# Patient Record
Sex: FEMALE | Race: WHITE | Employment: FULL TIME | URBAN - NONMETROPOLITAN AREA
[De-identification: names, ages, dates, MRNs, and addresses within clinical notes are randomized per-mention and may not be internally consistent; named-entity substitution may affect disease eponyms.]

---

## 2017-12-15 ENCOUNTER — HOSPITAL ENCOUNTER (EMERGENCY)
Age: 42
Discharge: HOME | End: 2017-12-15
Payer: COMMERCIAL

## 2017-12-15 ENCOUNTER — HOSPITAL ENCOUNTER (EMERGENCY)
Dept: GENERAL RADIOLOGY | Age: 42
Discharge: HOME | End: 2017-12-15
Payer: COMMERCIAL

## 2017-12-15 VITALS
OXYGEN SATURATION: 98 % | HEIGHT: 64 IN | WEIGHT: 129.5 LBS | BODY MASS INDEX: 22.11 KG/M2 | TEMPERATURE: 99.3 F | HEART RATE: 108 BPM | DIASTOLIC BLOOD PRESSURE: 76 MMHG | SYSTOLIC BLOOD PRESSURE: 124 MMHG | RESPIRATION RATE: 20 BRPM

## 2017-12-15 DIAGNOSIS — Z72.0 TOBACCO ABUSE: ICD-10-CM

## 2017-12-15 DIAGNOSIS — J20.9 ACUTE BRONCHITIS, UNSPECIFIED ORGANISM: Primary | ICD-10-CM

## 2017-12-15 LAB
FLU A ANTIGEN: NEGATIVE
GROUP A STREP CULTURE, REFLEX: NEGATIVE
INFLUENZA B AG, EIA: NEGATIVE
REFLEX THROAT C + S: NORMAL

## 2017-12-15 PROCEDURE — 99214 OFFICE O/P EST MOD 30 MIN: CPT | Performed by: NURSE PRACTITIONER

## 2017-12-15 PROCEDURE — 99214 OFFICE O/P EST MOD 30 MIN: CPT

## 2017-12-15 PROCEDURE — 87880 STREP A ASSAY W/OPTIC: CPT

## 2017-12-15 PROCEDURE — 71020 XR CHEST STANDARD TWO VW: CPT

## 2017-12-15 PROCEDURE — 87804 INFLUENZA ASSAY W/OPTIC: CPT

## 2017-12-15 PROCEDURE — 87070 CULTURE OTHR SPECIMN AEROBIC: CPT

## 2017-12-15 RX ORDER — IBUPROFEN 200 MG
800 TABLET ORAL EVERY 6 HOURS PRN
COMMUNITY
End: 2021-02-18 | Stop reason: ALTCHOICE

## 2017-12-15 RX ORDER — AZITHROMYCIN 250 MG/1
TABLET, FILM COATED ORAL
Qty: 6 TABLET | Refills: 0 | Status: SHIPPED | OUTPATIENT
Start: 2017-12-15 | End: 2017-12-25

## 2017-12-15 RX ORDER — LORATADINE AND PSEUDOEPHEDRINE 10; 240 MG/1; MG/1
1 TABLET, EXTENDED RELEASE ORAL DAILY
Qty: 14 TABLET | Refills: 0 | Status: SHIPPED | OUTPATIENT
Start: 2017-12-15 | End: 2017-12-29

## 2017-12-15 RX ORDER — BENZONATATE 200 MG/1
200 CAPSULE ORAL 3 TIMES DAILY PRN
Qty: 21 CAPSULE | Refills: 0 | Status: SHIPPED | OUTPATIENT
Start: 2017-12-15 | End: 2017-12-22

## 2017-12-15 ASSESSMENT — PAIN DESCRIPTION - PAIN TYPE: TYPE: ACUTE PAIN

## 2017-12-15 ASSESSMENT — ENCOUNTER SYMPTOMS
FACIAL SWELLING: 0
SORE THROAT: 1
STRIDOR: 0
COUGH: 1
SHORTNESS OF BREATH: 0
VOMITING: 0
ABDOMINAL PAIN: 0
TROUBLE SWALLOWING: 0
SINUS CONGESTION: 1
RHINORRHEA: 0
SINUS PAIN: 0
NAUSEA: 0
VOICE CHANGE: 0
SINUS PRESSURE: 0

## 2017-12-15 ASSESSMENT — PAIN SCALES - GENERAL: PAINLEVEL_OUTOF10: 7

## 2017-12-15 ASSESSMENT — PAIN DESCRIPTION - LOCATION: LOCATION: THROAT

## 2017-12-15 ASSESSMENT — PAIN DESCRIPTION - DESCRIPTORS: DESCRIPTORS: SORE

## 2017-12-15 NOTE — ED PROVIDER NOTES
Wil[facility]  Urgent Care Encounter      CHIEF COMPLAINT       Chief Complaint   Patient presents with    Pharyngitis     x2 days    Headache    Fever     102 F last night       Nurses Notes reviewed and I agree except as noted in the HPI. HISTORY OF PRESENT ILLNESS   Rhea Wen is a 36 y.o. female who presents:  She is a every day smoker. She is having a deep harsh cough productive but unable to specify the color of it. She has had a headache, sore throat since Wednesday fever developed 102 last night that she measured. She did feel warm Wednesday but did not measure her temperature. The history is provided by the patient. Pharyngitis   Location:  Generalized  Quality:  Sharp  Severity:  Moderate  Onset quality:  Sudden  Duration:  2 days  Timing:  Constant  Progression:  Unchanged  Chronicity:  New  Relieved by:  Nothing  Worsened by:  Nothing  Ineffective treatments:  NSAIDs  Associated symptoms: cough, fever, headaches and sinus congestion    Associated symptoms: no abdominal pain, no adenopathy, no chest pain, no chills, no ear discharge, no ear pain, no night sweats, no postnasal drip, no rash, no rhinorrhea, no shortness of breath, no stridor, no trouble swallowing and no voice change    Associated symptoms comment:  Positive for body aches  Cough:     Cough characteristics:  Productive    Sputum characteristics:  Unable to specify    Severity:  Mild    Onset quality:  Sudden    Duration:  2 days    Timing:  Intermittent    Progression:  Unchanged    Chronicity:  New  Fever:     Timing:  Rare    Max temp PTA:  102    Temp source:  Subjective  Headaches:     Severity:  Moderate    Onset quality:  Sudden    Duration:  2 days    Timing:  Sporadic    Progression:  Unchanged    Chronicity:  New  Risk factors: no exposure to strep, no exposure to mono and no sick contacts        REVIEW OF SYSTEMS     Review of Systems   Constitutional: Positive for fever.  Negative for activity change, appetite change, chills, diaphoresis, fatigue, night sweats and unexpected weight change. HENT: Positive for congestion and sore throat. Negative for ear discharge, ear pain, facial swelling, postnasal drip, rhinorrhea, sinus pain, sinus pressure, trouble swallowing and voice change. Respiratory: Positive for cough. Negative for shortness of breath and stridor. Cardiovascular: Negative for chest pain. Gastrointestinal: Negative for abdominal pain, nausea and vomiting. Musculoskeletal: Positive for myalgias. Skin: Negative for rash. Neurological: Positive for headaches. Hematological: Negative for adenopathy. PAST MEDICAL HISTORY   History reviewed. No pertinent past medical history. SURGICAL HISTORY     Patient  has a past surgical history that includes Tonsillectomy and  section. CURRENT MEDICATIONS       Previous Medications    IBUPROFEN (ADVIL;MOTRIN) 200 MG TABLET    Take 400 mg by mouth every 6 hours as needed for Pain       ALLERGIES     Patient is is allergic to codeine. FAMILY HISTORY     Patient's family history is not on file. SOCIAL HISTORY     Patient  reports that she has been smoking Cigarettes. She has a 5.00 pack-year smoking history. She has never used smokeless tobacco. She reports that she does not drink alcohol or use drugs. PHYSICAL EXAM     ED TRIAGE VITALS  BP: 124/76, Temp: 99.3 °F (37.4 °C), Pulse: 108, Resp: 20, SpO2: 98 %  Physical Exam   Constitutional: She is oriented to person, place, and time. She appears well-developed and well-nourished. Non-toxic appearance. She does not have a sickly appearance. She does not appear ill. No distress. HENT:   Head: Normocephalic and atraumatic. Head is without right periorbital erythema and without left periorbital erythema.    Right Ear: Hearing, tympanic membrane, external ear and ear canal normal.   Left Ear: Hearing, tympanic membrane, external ear and ear canal normal.   Nose: Mucosal

## 2017-12-18 LAB — THROAT/NOSE CULTURE: NORMAL

## 2019-01-03 ENCOUNTER — HOSPITAL ENCOUNTER (EMERGENCY)
Age: 44
Discharge: HOME | End: 2019-01-03
Payer: COMMERCIAL

## 2019-01-03 VITALS
BODY MASS INDEX: 20.04 KG/M2 | TEMPERATURE: 98.8 F | SYSTOLIC BLOOD PRESSURE: 170 MMHG | OXYGEN SATURATION: 97 % | WEIGHT: 117.4 LBS | HEIGHT: 64 IN | RESPIRATION RATE: 16 BRPM | HEART RATE: 97 BPM | DIASTOLIC BLOOD PRESSURE: 81 MMHG

## 2019-01-03 DIAGNOSIS — R55 SYNCOPE AND COLLAPSE: ICD-10-CM

## 2019-01-03 DIAGNOSIS — S01.01XA LACERATION OF SCALP, INITIAL ENCOUNTER: Primary | ICD-10-CM

## 2019-01-03 PROCEDURE — 2709999900 HC NON-CHARGEABLE SUPPLY

## 2019-01-03 PROCEDURE — 99212 OFFICE O/P EST SF 10 MIN: CPT

## 2019-01-03 PROCEDURE — 99213 OFFICE O/P EST LOW 20 MIN: CPT | Performed by: NURSE PRACTITIONER

## 2019-01-03 ASSESSMENT — ENCOUNTER SYMPTOMS: COLOR CHANGE: 0

## 2019-03-16 ENCOUNTER — HOSPITAL ENCOUNTER (EMERGENCY)
Age: 44
Discharge: HOME | End: 2019-03-16
Attending: INTERNAL MEDICINE

## 2019-03-16 ENCOUNTER — APPOINTMENT (OUTPATIENT)
Dept: CT IMAGING | Age: 44
End: 2019-03-16

## 2019-03-16 VITALS
RESPIRATION RATE: 16 BRPM | WEIGHT: 130 LBS | TEMPERATURE: 98.5 F | HEART RATE: 129 BPM | OXYGEN SATURATION: 98 % | BODY MASS INDEX: 22.2 KG/M2 | HEIGHT: 64 IN | SYSTOLIC BLOOD PRESSURE: 167 MMHG | DIASTOLIC BLOOD PRESSURE: 85 MMHG

## 2019-03-16 DIAGNOSIS — R55 SYNCOPE AND COLLAPSE: Primary | ICD-10-CM

## 2019-03-16 DIAGNOSIS — S09.90XA INJURY OF HEAD, INITIAL ENCOUNTER: ICD-10-CM

## 2019-03-16 LAB
ALBUMIN SERPL-MCNC: 4.9 G/DL (ref 3.5–5.1)
ALP BLD-CCNC: 73 U/L (ref 38–126)
ALT SERPL-CCNC: 18 U/L (ref 11–66)
ANION GAP SERPL CALCULATED.3IONS-SCNC: 20 MEQ/L (ref 8–16)
AST SERPL-CCNC: 27 U/L (ref 5–40)
ATYPICAL LYMPHOCYTES: ABNORMAL %
BASOPHILS # BLD: 1 %
BASOPHILS ABSOLUTE: 0.1 THOU/MM3 (ref 0–0.1)
BILIRUB SERPL-MCNC: < 0.2 MG/DL (ref 0.3–1.2)
BUN BLDV-MCNC: 7 MG/DL (ref 7–22)
CALCIUM SERPL-MCNC: 9.1 MG/DL (ref 8.5–10.5)
CHLORIDE BLD-SCNC: 97 MEQ/L (ref 98–111)
CO2: 20 MEQ/L (ref 23–33)
CREAT SERPL-MCNC: 0.7 MG/DL (ref 0.4–1.2)
EKG ATRIAL RATE: 125 BPM
EKG P AXIS: 81 DEGREES
EKG P-R INTERVAL: 180 MS
EKG Q-T INTERVAL: 298 MS
EKG QRS DURATION: 90 MS
EKG QTC CALCULATION (BAZETT): 430 MS
EKG R AXIS: 41 DEGREES
EKG T AXIS: 62 DEGREES
EKG VENTRICULAR RATE: 125 BPM
ELLIPTOCYTES: ABNORMAL
EOSINOPHIL # BLD: 2 %
EOSINOPHILS ABSOLUTE: 0.2 THOU/MM3 (ref 0–0.4)
ERYTHROCYTE [DISTWIDTH] IN BLOOD BY AUTOMATED COUNT: 21.7 % (ref 11.5–14.5)
ERYTHROCYTE [DISTWIDTH] IN BLOOD BY AUTOMATED COUNT: 61.5 FL (ref 35–45)
GFR SERPL CREATININE-BSD FRML MDRD: > 90 ML/MIN/1.73M2
GLUCOSE BLD-MCNC: 102 MG/DL (ref 70–108)
HCT VFR BLD CALC: 35.9 % (ref 37–47)
HEMOGLOBIN: 10.2 GM/DL (ref 12–16)
HYPOCHROMIA: PRESENT
IMMATURE GRANS (ABS): 0.03 THOU/MM3 (ref 0–0.07)
IMMATURE GRANULOCYTES: 0.3 %
LYMPHOCYTES # BLD: 38 %
LYMPHOCYTES ABSOLUTE: 4 THOU/MM3 (ref 1–4.8)
MCH RBC QN AUTO: 22.6 PG (ref 26–33)
MCHC RBC AUTO-ENTMCNC: 28.4 GM/DL (ref 32.2–35.5)
MCV RBC AUTO: 79.6 FL (ref 81–99)
MONOCYTES # BLD: 9 %
MONOCYTES ABSOLUTE: 0.9 THOU/MM3 (ref 0.4–1.3)
NUCLEATED RED BLOOD CELLS: 0 /100 WBC
OSMOLALITY CALCULATION: 272 MOSMOL/KG (ref 275–300)
PLATELET # BLD: 493 THOU/MM3 (ref 130–400)
PLATELET ESTIMATE: ADEQUATE
PMV BLD AUTO: 11.2 FL (ref 9.4–12.4)
POIKILOCYTES: ABNORMAL
POTASSIUM SERPL-SCNC: 3.5 MEQ/L (ref 3.5–5.2)
RBC # BLD: 4.51 MILL/MM3 (ref 4.2–5.4)
SCAN OF BLOOD SMEAR: NORMAL
SEG NEUTROPHILS: 50 %
SEGMENTED NEUTROPHILS ABSOLUTE COUNT: 5.2 THOU/MM3 (ref 1.8–7.7)
SODIUM BLD-SCNC: 137 MEQ/L (ref 135–145)
TARGET CELLS: ABNORMAL
TOTAL PROTEIN: 7.7 G/DL (ref 6.1–8)
WBC # BLD: 10.4 THOU/MM3 (ref 4.8–10.8)

## 2019-03-16 PROCEDURE — 99284 EMERGENCY DEPT VISIT MOD MDM: CPT

## 2019-03-16 PROCEDURE — 80053 COMPREHEN METABOLIC PANEL: CPT

## 2019-03-16 PROCEDURE — 85025 COMPLETE CBC W/AUTO DIFF WBC: CPT

## 2019-03-16 PROCEDURE — 36415 COLL VENOUS BLD VENIPUNCTURE: CPT

## 2019-03-16 RX ORDER — 0.9 % SODIUM CHLORIDE 0.9 %
1000 INTRAVENOUS SOLUTION INTRAVENOUS ONCE
Status: DISCONTINUED | OUTPATIENT
Start: 2019-03-16 | End: 2019-03-16 | Stop reason: HOSPADM

## 2019-03-16 ASSESSMENT — ENCOUNTER SYMPTOMS
SHORTNESS OF BREATH: 0
VOMITING: 0
ABDOMINAL PAIN: 0
NAUSEA: 0
DIARRHEA: 0
BACK PAIN: 0
EYE DISCHARGE: 0
COUGH: 0
RHINORRHEA: 0
WHEEZING: 0
SORE THROAT: 0
EYE PAIN: 0

## 2019-05-24 ENCOUNTER — HOSPITAL ENCOUNTER (EMERGENCY)
Age: 44
Discharge: HOME | End: 2019-05-24

## 2019-05-24 VITALS
WEIGHT: 132 LBS | TEMPERATURE: 97.9 F | OXYGEN SATURATION: 98 % | RESPIRATION RATE: 18 BRPM | DIASTOLIC BLOOD PRESSURE: 78 MMHG | SYSTOLIC BLOOD PRESSURE: 152 MMHG | BODY MASS INDEX: 22.66 KG/M2 | HEART RATE: 90 BPM

## 2019-05-24 DIAGNOSIS — K08.89 PAIN, DENTAL: Primary | ICD-10-CM

## 2019-05-24 DIAGNOSIS — K04.7 DENTAL ABSCESS: ICD-10-CM

## 2019-05-24 PROCEDURE — 99213 OFFICE O/P EST LOW 20 MIN: CPT | Performed by: NURSE PRACTITIONER

## 2019-05-24 PROCEDURE — 99212 OFFICE O/P EST SF 10 MIN: CPT

## 2019-05-24 RX ORDER — AMOXICILLIN AND CLAVULANATE POTASSIUM 875; 125 MG/1; MG/1
1 TABLET, FILM COATED ORAL 2 TIMES DAILY WITH MEALS
Qty: 14 TABLET | Refills: 0 | Status: SHIPPED | OUTPATIENT
Start: 2019-05-24 | End: 2019-05-31

## 2019-05-24 ASSESSMENT — ENCOUNTER SYMPTOMS
SHORTNESS OF BREATH: 0
DIARRHEA: 0
FACIAL SWELLING: 1
VOMITING: 0
ABDOMINAL PAIN: 0
NAUSEA: 0
CHEST TIGHTNESS: 0

## 2019-05-24 ASSESSMENT — PAIN DESCRIPTION - LOCATION: LOCATION: TEETH

## 2019-05-24 ASSESSMENT — PAIN DESCRIPTION - ORIENTATION: ORIENTATION: LOWER;LEFT

## 2019-05-24 ASSESSMENT — PAIN DESCRIPTION - FREQUENCY: FREQUENCY: CONTINUOUS

## 2019-05-24 ASSESSMENT — PAIN DESCRIPTION - DESCRIPTORS: DESCRIPTORS: ACHING

## 2019-05-24 ASSESSMENT — PAIN SCALES - GENERAL: PAINLEVEL_OUTOF10: 7

## 2019-05-24 ASSESSMENT — PAIN DESCRIPTION - PAIN TYPE: TYPE: ACUTE PAIN

## 2019-05-24 NOTE — ED TRIAGE NOTES
Pt ambulatory into [facility] with c/o lower left dental pain for the past two days. Pt states pain 7. Pt states she has a dentist in [place] but lost her medical when her  passed away in December. Pt states she is waiting to hear back about medicaid.

## 2019-05-24 NOTE — ED PROVIDER NOTES
39 [address] Reilly       Chief Complaint   Patient presents with    Dental Pain     lower left        Nurses Notes reviewed and I agree except as noted in the HPI. HISTORY OF PRESENT ILLNESS   Rhea Damico is a 39 y.o. female who presents for evaluation of dental pain and swelling, left lower that began several days ago. Patient reports that she knows that the area the hurts once had a root canal that \"went wrong. \"  She reports sensitivity to cold and hot foods. Reports that she does have a dentist but is currently awaiting insurance coverage following the death of her . REVIEW OF SYSTEMS     Review of Systems   Constitutional: Negative for chills and fever. HENT: Positive for dental problem and facial swelling. Eyes: Negative for visual disturbance. Respiratory: Negative for chest tightness and shortness of breath. Cardiovascular: Negative for chest pain. Gastrointestinal: Negative for abdominal pain, diarrhea, nausea and vomiting. Genitourinary: Negative for dysuria. Musculoskeletal: Negative for joint swelling. Skin: Negative for rash. Allergic/Immunologic: Negative for environmental allergies and food allergies. Neurological: Negative for headaches. Psychiatric/Behavioral: The patient is not nervous/anxious. PAST MEDICAL HISTORY   History reviewed. No pertinent past medical history. SURGICAL HISTORY     Patient  has a past surgical history that includes Tonsillectomy and  section. CURRENT MEDICATIONS       Discharge Medication List as of 2019  3:41 PM      CONTINUE these medications which have NOT CHANGED    Details   ibuprofen (ADVIL;MOTRIN) 200 MG tablet Take 600 mg by mouth every 6 hours as needed for Pain Historical Med             ALLERGIES     Patient is is allergic to codeine. FAMILY HISTORY     Patient'sfamily history is not on file.     SOCIAL HISTORY     Patient  reports that she has been smoking cigarettes. She has a 5.00 pack-year smoking history. She has never used smokeless tobacco. She reports that she does not drink alcohol or use drugs. PHYSICAL EXAM     ED TRIAGE VITALS  BP: (!) 152/78, Temp: 97.9 °F (36.6 °C), Pulse: 90, Resp: 18, SpO2: 98 %  Physical Exam   Constitutional: She is oriented to person, place, and time. She appears well-developed and well-nourished. She is cooperative. No distress. HENT:   Head: Normocephalic and atraumatic. Mouth/Throat: Mucous membranes are normal. Abnormal dentition. Dental caries present. Eyes: Conjunctivae are normal. Right conjunctiva is not injected. Left conjunctiva is not injected. Pupils are equal.   Neck: Normal range of motion. Cardiovascular: Normal rate. Pulmonary/Chest: Effort normal.   Musculoskeletal: Normal range of motion. Lymphadenopathy:     She has no cervical adenopathy. Neurological: She is alert and oriented to person, place, and time. Skin: Skin is warm and dry. No rash (on exposed surfaces) noted. Psychiatric: She has a normal mood and affect. Her speech is normal.   Nursing note and vitals reviewed. DIAGNOSTIC RESULTS   Labs: No results found for this visit on 05/24/19. IMAGING:  No orders to display     URGENT CARE COURSE:     Vitals:    05/24/19 1526   BP: (!) 152/78   Pulse: 90   Resp: 18   Temp: 97.9 °F (36.6 °C)   TempSrc: Temporal   SpO2: 98%   Weight: 132 lb (59.9 kg)       Medications - No data to display  PROCEDURES:  None  FINALIMPRESSION      1. Pain, dental    2. Dental abscess        DISPOSITION/PLAN   DISPOSITION    Discharge   Physical assessment findings, diagnostic testing(s) if applicable, and vital signs reviewed with patient/patient representative. Questions answered. Medications as directed, including OTC medications for supportive care. Education provided on medications. Differential diagnosis(s) discussed with patient/patient representative.   Home care/self care instructions reviewed with patient/patient representative. Patient is to follow-up with family care provider in 2-3 days if no improvement. Patient is to go to the emergency department if symptoms worsen. Patient/patient representative is aware of care plan, questions answered, verbalizes understanding and is in agreement. Teach back method used for patient/patient representative teaching(s) and printed instructions attached to after visit summary. PATIENT REFERRED TO:  [address] [facility] [address]  [facility]. [address] [address]   Schedule an appointment as soon as possible for a visit in 3 days  if you do not have a family provider    [facility]  [address]  [phone]    As needed, If symptoms worsen, GO DIRECTLY TO THE EMERGENCY DEPARTMENT    DISCHARGE MEDICATIONS:  Discharge Medication List as of 5/24/2019  3:41 PM      START taking these medications    Details   amoxicillin-clavulanate (AUGMENTIN) 875-125 MG per tablet Take 1 tablet by mouth 2 times daily (with meals) for 7 days, Disp-14 tablet, R-0Normal           Discharge Medication List as of 5/24/2019  3:41 PM          Shannan Glasgow, [address], APRN - CNP  05/24/19 4301

## 2019-05-24 NOTE — ED NOTES
Pt verbalized discharge instructions. Pt informed to go to ER if develop chest pain, shortness of breath or abdominal pain. Pt ambulatory out in stable condition. Assessment unchanged.        Ayush Nagel RN  05/24/19 1332

## 2020-06-18 ENCOUNTER — HOSPITAL ENCOUNTER (EMERGENCY)
Age: 45
Discharge: HOME | End: 2020-06-18
Payer: MEDICAID

## 2020-06-18 VITALS
RESPIRATION RATE: 16 BRPM | SYSTOLIC BLOOD PRESSURE: 152 MMHG | TEMPERATURE: 97.2 F | WEIGHT: 116 LBS | OXYGEN SATURATION: 99 % | HEART RATE: 96 BPM | BODY MASS INDEX: 19.91 KG/M2 | DIASTOLIC BLOOD PRESSURE: 73 MMHG

## 2020-06-18 PROCEDURE — 99213 OFFICE O/P EST LOW 20 MIN: CPT | Performed by: NURSE PRACTITIONER

## 2020-06-18 PROCEDURE — 99213 OFFICE O/P EST LOW 20 MIN: CPT

## 2020-06-18 PROCEDURE — 87169 MACROSCOPIC EXAM PARASITE: CPT

## 2020-06-18 RX ORDER — IVERMECTIN 3 MG/1
400 TABLET ORAL ONCE
Qty: 7 TABLET | Refills: 0 | Status: SHIPPED | OUTPATIENT
Start: 2020-06-18 | End: 2020-06-18

## 2020-06-18 RX ORDER — PERMETHRIN 50 MG/G
CREAM TOPICAL
Qty: 1 TUBE | Refills: 1 | Status: SHIPPED | OUTPATIENT
Start: 2020-06-18 | End: 2020-09-03 | Stop reason: ALTCHOICE

## 2020-06-18 ASSESSMENT — ENCOUNTER SYMPTOMS
CHEST TIGHTNESS: 0
VOMITING: 0
SHORTNESS OF BREATH: 0
HOARSE VOICE: 0
ABDOMINAL PAIN: 0
COUGH: 0
WHEEZING: 0
COLOR CHANGE: 0
SORE THROAT: 0
STRIDOR: 0
DIARRHEA: 1
RECTAL PAIN: 0
APNEA: 0
ANAL BLEEDING: 0
BACK PAIN: 0
BLOOD IN STOOL: 0
CHOKING: 0
ABDOMINAL DISTENTION: 0
NAUSEA: 0
PERI-ORBITAL EDEMA: 0
CONSTIPATION: 0
THROAT SWELLING: 0

## 2020-06-18 NOTE — ED NOTES
Pt verbalized discharge instructions. Pt informed to go to ER if develop chest pain, shortness of breath or abdominal pain. Pt ambulatory out in stable condition. Assessment unchanged.        Chaim Ruffin RN  06/18/20 8613

## 2020-06-18 NOTE — ED PROVIDER NOTES
appetite change, chills, diaphoresis, fatigue and fever. HENT: Negative for hoarse voice and sore throat. Respiratory: Negative for apnea, cough, choking, chest tightness, shortness of breath, wheezing and stridor. Cardiovascular: Negative for chest pain, palpitations and leg swelling. Gastrointestinal: Positive for diarrhea. Negative for abdominal distention, abdominal pain, anal bleeding, blood in stool, constipation, nausea, rectal pain and vomiting. Musculoskeletal: Negative for arthralgias, back pain, gait problem, joint swelling, myalgias, neck pain and neck stiffness. Skin: Positive for rash. Negative for color change, pallor and wound. Neurological: Negative for dizziness, light-headedness and headaches. PAST MEDICAL HISTORY   History reviewed. No pertinent past medical history. SURGICAL HISTORY     Patient  has a past surgical history that includes Tonsillectomy and  section. CURRENT MEDICATIONS       Discharge Medication List as of 2020  7:11 PM      CONTINUE these medications which have NOT CHANGED    Details   OLIVE LEAF PO Take by mouthHistorical Med      Sulfur 3 % BAR Apply topicallyHistorical Med      ibuprofen (ADVIL;MOTRIN) 200 MG tablet Take 800 mg by mouth every 6 hours as needed for Pain Historical Med             ALLERGIES     Patient is is allergic to codeine. FAMILY HISTORY     Patient's family history is not on file. SOCIAL HISTORY     Patient  reports that she has been smoking cigarettes. She has a 5.00 pack-year smoking history. She has never used smokeless tobacco. She reports that she does not drink alcohol or use drugs. PHYSICAL EXAM     ED TRIAGE VITALS  BP: (!) 152/73, Temp: 97.2 °F (36.2 °C), Pulse: 96, Resp: 16, SpO2: 99 %  Physical Exam  Vitals signs and nursing note reviewed. Constitutional:       Appearance: Normal appearance. HENT:      Head: Normocephalic and atraumatic.       Nose: Nose normal.   Eyes:      Extraocular

## 2020-06-20 LAB — MISC. #1 REFERENCE GROUP TEST: NORMAL

## 2020-09-02 ENCOUNTER — OFFICE VISIT (OUTPATIENT)
Dept: FAMILY MEDICINE CLINIC | Age: 45
End: 2020-09-02
Payer: MEDICAID

## 2020-09-02 VITALS
WEIGHT: 112.2 LBS | OXYGEN SATURATION: 99 % | SYSTOLIC BLOOD PRESSURE: 138 MMHG | BODY MASS INDEX: 19.88 KG/M2 | TEMPERATURE: 98.5 F | HEART RATE: 104 BPM | DIASTOLIC BLOOD PRESSURE: 91 MMHG | HEIGHT: 63 IN

## 2020-09-02 PROCEDURE — 99386 PREV VISIT NEW AGE 40-64: CPT | Performed by: NURSE PRACTITIONER

## 2020-09-02 PROCEDURE — G8431 POS CLIN DEPRES SCRN F/U DOC: HCPCS | Performed by: NURSE PRACTITIONER

## 2020-09-02 PROCEDURE — 99204 OFFICE O/P NEW MOD 45 MIN: CPT | Performed by: NURSE PRACTITIONER

## 2020-09-02 PROCEDURE — G8427 DOCREV CUR MEDS BY ELIG CLIN: HCPCS | Performed by: NURSE PRACTITIONER

## 2020-09-02 PROCEDURE — G8420 CALC BMI NORM PARAMETERS: HCPCS | Performed by: NURSE PRACTITIONER

## 2020-09-02 PROCEDURE — 4004F PT TOBACCO SCREEN RCVD TLK: CPT | Performed by: NURSE PRACTITIONER

## 2020-09-02 RX ORDER — PNEUMOCOCCAL VACCINE POLYVALENT 25; 25; 25; 25; 25; 25; 25; 25; 25; 25; 25; 25; 25; 25; 25; 25; 25; 25; 25; 25; 25; 25; 25 UG/.5ML; UG/.5ML; UG/.5ML; UG/.5ML; UG/.5ML; UG/.5ML; UG/.5ML; UG/.5ML; UG/.5ML; UG/.5ML; UG/.5ML; UG/.5ML; UG/.5ML; UG/.5ML; UG/.5ML; UG/.5ML; UG/.5ML; UG/.5ML; UG/.5ML; UG/.5ML; UG/.5ML; UG/.5ML; UG/.5ML
0.5 INJECTION, SOLUTION INTRAMUSCULAR; SUBCUTANEOUS ONCE
Qty: 0.5 ML | Refills: 0 | Status: SHIPPED | OUTPATIENT
Start: 2020-09-02 | End: 2020-09-02

## 2020-09-02 RX ORDER — CETIRIZINE HYDROCHLORIDE 10 MG/1
10 TABLET ORAL DAILY
Qty: 30 TABLET | Refills: 0 | Status: SHIPPED | OUTPATIENT
Start: 2020-09-02 | End: 2020-10-02

## 2020-09-02 ASSESSMENT — PATIENT HEALTH QUESTIONNAIRE - PHQ9
SUM OF ALL RESPONSES TO PHQ QUESTIONS 1-9: 18
8. MOVING OR SPEAKING SO SLOWLY THAT OTHER PEOPLE COULD HAVE NOTICED. OR THE OPPOSITE, BEING SO FIGETY OR RESTLESS THAT YOU HAVE BEEN MOVING AROUND A LOT MORE THAN USUAL: 1
3. TROUBLE FALLING OR STAYING ASLEEP: 3
10. IF YOU CHECKED OFF ANY PROBLEMS, HOW DIFFICULT HAVE THESE PROBLEMS MADE IT FOR YOU TO DO YOUR WORK, TAKE CARE OF THINGS AT HOME, OR GET ALONG WITH OTHER PEOPLE: 1
1. LITTLE INTEREST OR PLEASURE IN DOING THINGS: 3
9. THOUGHTS THAT YOU WOULD BE BETTER OFF DEAD, OR OF HURTING YOURSELF: 1
5. POOR APPETITE OR OVEREATING: 0
7. TROUBLE CONCENTRATING ON THINGS, SUCH AS READING THE NEWSPAPER OR WATCHING TELEVISION: 1
6. FEELING BAD ABOUT YOURSELF - OR THAT YOU ARE A FAILURE OR HAVE LET YOURSELF OR YOUR FAMILY DOWN: 3
SUM OF ALL RESPONSES TO PHQ9 QUESTIONS 1 & 2: 6
SUM OF ALL RESPONSES TO PHQ QUESTIONS 1-9: 18
4. FEELING TIRED OR HAVING LITTLE ENERGY: 3
2. FEELING DOWN, DEPRESSED OR HOPELESS: 3

## 2020-09-02 NOTE — PROGRESS NOTES
[facility],[address] [facility], [facility]. [place] [address] [facility]  Dept: [phone]  Dept Fax: : [phone]  Loc Fax: [phone]     Sarah Zarate is a 38 y.o. female who presents today for her medical conditions/complaintsas noted below. Chief Complaint   Patient presents with    Establish Care       HPI:      Here to establish care    Family- lives in [facility] and has family that is around     Occupation- was working until December    -  bebeto  in 2018 from mad cow disease-     Kelly Moore is soon to be daughter in law and grand daughter are my patients     Diet- decreased appetite     Exercise- walking every day, [place], [place]     Sleep - is having trouble getting to sleep and staying asleep     Dental- has not been in months,     Eye- did have, but does not have now, states that she gets headaches. Did see a neurologist in the past. Excedrin    Last labs?  Has not had     PAP- has been years    Mammogram- has not had one    Elevated blood pressure reading   Onset today  Has not been told this in the past  Denies cp or palpitations     Depression  Onset years ago  Last episodes was 11 years ago  Symptoms returned the last year  Did take zoloft in the past.   Lost 20 lbs in a year and half   Does not feel like eating  Wants candy all of the time  Talked to johanny in the past    Heartburn  Worse in the morning  Coffee and cholcoate will make it worse    Easy bruising and rash -   Onset 6 months ago  States was seen at urgent care  Was treated for scabies, and this went away with cream   Lost her insurance and   No itching  Has been using sulfar soap  Is picking at her skin, worse at night, states she digs white material at times     Hair has been falling out              Medications:    Current Outpatient Medications:     sertraline (ZOLOFT) 50 MG tablet, Take 1 tablet by Neurological: Positive for headaches. Negative for dizziness, weakness and numbness. Psychiatric/Behavioral: Positive for agitation, behavioral problems (skin picking disorder), decreased concentration and sleep disturbance. Negative for dysphoric mood, hallucinations, self-injury and suicidal ideas. The patient is nervous/anxious. The patient is not hyperactive. Objective:     BP (!) 138/91 (Site: Right Upper Arm, Position: Sitting, Cuff Size: Medium Adult)   Pulse 104   Temp 98.5 °F (36.9 °C) (Temporal)   Ht 5' 3\" (1.6 m)   Wt 112 lb 3.2 oz (50.9 kg)   LMP 12/01/2019 (Approximate)   SpO2 99%   BMI 19.88 kg/m²      Physical Exam  Vitals signs reviewed. Constitutional:       Appearance: She is well-developed. HENT:      Head: Normocephalic and atraumatic. Right Ear: External ear normal.      Left Ear: External ear normal.   Eyes:      Conjunctiva/sclera: Conjunctivae normal.   Neck:      Musculoskeletal: Normal range of motion and neck supple. No spinous process tenderness. Thyroid: No thyromegaly. Trachea: Trachea normal.   Cardiovascular:      Rate and Rhythm: Normal rate and regular rhythm. Heart sounds: Normal heart sounds. No murmur. No friction rub. No gallop. Pulmonary:      Effort: Pulmonary effort is normal.      Breath sounds: Normal breath sounds. Abdominal:      General: Bowel sounds are normal.      Palpations: Abdomen is soft. Tenderness: There is no abdominal tenderness. Musculoskeletal: Normal range of motion. Skin:     General: Skin is warm and dry. Findings: Rash present. No erythema. Comments: Scabs in different stages of healing consistent with skin picking disorder, noted to torso, arms and legs and face, some hyperpigmentation    Neurological:      Mental Status: She is alert and oriented to person, place, and time. Psychiatric:         Speech: Speech normal.         Behavior: Behavior normal.         Thought Content:  Thought content normal.         Assessment/Plan:      Rhea was seen today for establish care. Diagnoses and all orders for this visit:    Annual physical exam  -     CBC Auto Differential; Future  -     Comprehensive Metabolic Panel, Fasting; Future  -     Hemoglobin A1C; Future  -     Lipid Panel; Future  -     TSH with Reflex; Future  -     JEFFERSON DIGITAL SCREEN W OR WO CAD BILATERAL; Future  -     Tdap (ADACEL) 5-2-15.5 LF-MCG/0.5 injection; Inject 0.5 mLs into the muscle once for 1 dose  -     pneumococcal polyvalent (PNEUMOVAX 23) 25 MCG/0.5ML inj; Inject 0.5 mLs into the muscle once for 1 dose    Screening for breast cancer  -     JEFFERSON DIGITAL SCREEN W OR WO CAD BILATERAL; Future    Screening for colon cancer  -     [facility]) - Carrie Vance MD, Gastroenterology, [address] [facility]    Need for diphtheria-tetanus-pertussis (Tdap) vaccine  -     Tdap (ADACEL) 5-2-15.5 LF-MCG/0.5 injection; Inject 0.5 mLs into the muscle once for 1 dose    Need for prophylactic vaccination against Streptococcus pneumoniae (pneumococcus)  -     pneumococcal polyvalent (PNEUMOVAX 23) 25 MCG/0.5ML inj; Inject 0.5 mLs into the muscle once for 1 dose    Positive depression screening  -     Positive Screen for Clinical Depression with a Documented Follow-up Plan     Moderate major depression ([facility] [address]   Uncontrolled   Concerned that rash is skin picking disorder   May swab site and treat for staph infection next week   Would like for her to get labs and rule out any thyroid issues   -     sertraline (ZOLOFT) 50 MG tablet; Take 1 tablet by mouth daily  -     cetirizine (ZYRTEC) 10 MG tablet; Take 1 tablet by mouth daily   Weight loss was gradual but significant over the last couple of years, likely associated with depression- however recommended labs and preventative health work up   Itching  -     cetirizine (ZYRTEC) 10 MG tablet;  Take 1 tablet by mouth daily    Skin-picking disorder   See above   Elevated blood pressure reading   Likely could be uncontrolled depression    Will re eval in a week when she returns      Return in about 1 week (around 9/9/2020) for fu labs, bp, mood . Rhea received counselingon the following healthy behaviors: nutrition and exercise    Patientgiven educational materials on wellness    I have instructedStyves to complete a self tracking handout on Weights and instructedthem to bring it with them to her next appointment. Discussed use, benefit, andside effects of prescribed medications. Barriers to medication compliance addressed. All patient questions answered. Pt voiced understanding. Electronically signedby MARIAELENA Poole CNP on 9/3/2020 at 2:51 PM      On the basis of positive PHQ-9 screening (PHQ-9 Total Score: 18), the following plan was implemented: medication prescribed: Zoloft- started today- patient will call for any significant medication side effects or worsening symptoms of depression. Patient will follow-up in 2 week(s) with PCP.

## 2020-09-02 NOTE — PATIENT INSTRUCTIONS

## 2020-09-03 PROBLEM — L29.9 ITCHING: Status: ACTIVE | Noted: 2020-09-03

## 2020-09-03 PROBLEM — F42.4 SKIN-PICKING DISORDER: Status: ACTIVE | Noted: 2020-09-03

## 2020-09-03 PROBLEM — Z13.31 POSITIVE DEPRESSION SCREENING: Status: ACTIVE | Noted: 2020-09-03

## 2020-09-03 PROBLEM — F32.1 MODERATE MAJOR DEPRESSION (HCC): Status: ACTIVE | Noted: 2020-09-03

## 2020-09-03 PROBLEM — R03.0 ELEVATED BLOOD PRESSURE READING: Status: ACTIVE | Noted: 2020-09-03

## 2020-09-03 ASSESSMENT — ENCOUNTER SYMPTOMS
COUGH: 0
CHEST TIGHTNESS: 0
ABDOMINAL PAIN: 0
DIARRHEA: 0
CONSTIPATION: 0
VOMITING: 0
EYE DISCHARGE: 0
RHINORRHEA: 0
SHORTNESS OF BREATH: 0

## 2020-10-19 ENCOUNTER — VIRTUAL VISIT (OUTPATIENT)
Dept: FAMILY MEDICINE CLINIC | Age: 45
End: 2020-10-19
Payer: MEDICAID

## 2020-10-19 PROCEDURE — G8427 DOCREV CUR MEDS BY ELIG CLIN: HCPCS | Performed by: NURSE PRACTITIONER

## 2020-10-19 PROCEDURE — 99213 OFFICE O/P EST LOW 20 MIN: CPT | Performed by: NURSE PRACTITIONER

## 2020-10-19 ASSESSMENT — ENCOUNTER SYMPTOMS
EYE DISCHARGE: 0
VOMITING: 0
CONSTIPATION: 0
RHINORRHEA: 0
SHORTNESS OF BREATH: 0
DIARRHEA: 0
ABDOMINAL PAIN: 0
SORE THROAT: 1
COUGH: 0
CHEST TIGHTNESS: 0

## 2020-10-19 NOTE — PROGRESS NOTES
[facility],[address] [facility]. [place] [address] [facility]  Dept: [phone]  Dept Fax: : [phone]  [facility] Fax: [phone]     Evelyn Cook is a 54 y.o. female who presents today for her medical conditions/complaintsas noted below. Chief Complaint   Patient presents with    Concern For COVID-19       HPI:      Concern for covid  Had exposure from a friend  Onset of symptoms x 1 day   Has sore throat headaches, fevers and body aches   Has not tried anything for symptoms  Denies SOB or cough  Is not currently working. Medications:    Current Outpatient Medications:     sertraline (ZOLOFT) 50 MG tablet, Take 1 tablet by mouth daily, Disp: 30 tablet, Rfl: 1    OLIVE LEAF PO, Take by mouth, Disp: , Rfl:     Sulfur 3 % BAR, Apply topically, Disp: , Rfl:     hydrocortisone (WESTCORT) 0.2 % cream, Apply topically 2 times daily. , Disp: 45 g, Rfl: 0    ibuprofen (ADVIL;MOTRIN) 200 MG tablet, Take 800 mg by mouth every 6 hours as needed for Pain , Disp: , Rfl:     The patientis allergic to codeine. Past Medical History  Rhea  has a past medical history of Moderate major depression ([address] Past Surgical History  The patient  has a past surgical history that includes Tonsillectomy and  section. Family History  This patient's family history includes High Blood Pressure in her father and mother; Osteoporosis in her mother. Social History  Rhea  reports that she has been smoking cigarettes. She has a 5.00 pack-year smoking history. She has never used smokeless tobacco. She reports that she does not drink alcohol or use drugs.     Health Maintenance  Health Maintenance Due   Topic Date Due    Pneumococcal 0-64 years Vaccine (1 of 1 - PPSV23) 1980    DTaP/Tdap/Td vaccine (1 - Tdap) 1993    Cervical cancer screen  1995    Lipid screen  2014    Flu vaccine (1) 2020       Subjective:     Review of Systems   Constitutional: Positive for fever. Negative for activity change and appetite change. HENT: Positive for sore throat. Negative for congestion, ear pain and rhinorrhea. Eyes: Negative for discharge and visual disturbance. Respiratory: Negative for cough, chest tightness and shortness of breath. Cardiovascular: Negative for chest pain and palpitations. Gastrointestinal: Negative for abdominal pain, constipation, diarrhea and vomiting. Genitourinary: Negative for difficulty urinating and hematuria. Musculoskeletal: Positive for myalgias. Negative for arthralgias. Skin: Positive for rash. Hair is falling out  rash   Neurological: Positive for headaches. Negative for dizziness, weakness and numbness. Psychiatric/Behavioral: Positive for agitation, behavioral problems (skin picking disorder), decreased concentration and sleep disturbance. Negative for dysphoric mood, hallucinations, self-injury and suicidal ideas. The patient is nervous/anxious. The patient is not hyperactive. Objective: There were no vitals taken for this visit. Physical Exam  Constitutional:       General: She is not in acute distress. Appearance: She is well-developed. Interventions: She is not intubated. HENT:      Head: Normocephalic and atraumatic. Right Ear: External ear normal.      Left Ear: External ear normal.   Eyes:      General: Lids are normal.      Conjunctiva/sclera: Conjunctivae normal.   Neck:      Musculoskeletal: Normal range of motion. Pulmonary:      Effort: No tachypnea, bradypnea, prolonged expiration, respiratory distress or retractions. She is not intubated. Skin:     Coloration: Skin is not pale. Findings: No erythema or rash. Neurological:      Mental Status: She is alert and oriented to person, place, and time.    Psychiatric:         Attention and Perception: Attention and perception normal.         Mood and Affect: Mood and affect normal. Speech: Speech normal.         Behavior: Behavior normal. Behavior is cooperative. Thought Content: Thought content normal.         Cognition and Memory: Cognition and memory normal.         Judgment: Judgment normal.         Assessment/Plan:      Rhea was seen today for concern for covid-19. Diagnoses and all orders for this visit:    Fever with exposure to COVID-19 virus  -     COVID-19 Ambulatory; Future      Rest, increase fluids isamar water, tylenol or motrin  Cool mist humidifier  Is going to [facility] for testing    No follow-ups on file. Zurdo Campbell is a 54 y.o. female being evaluated by a Virtual Visit (video visit) encounter to address concerns as mentioned above. A caregiver was present when appropriate. Due to this being a TeleHealth encounter (During [facility] public health emergency), evaluation of the following organ systems was limited: Vitals/Constitutional/EENT/Resp/CV/GI//MS/Neuro/Skin/Heme-Lymph-Imm. Pursuant to the emergency declaration under the [address] [place] [address] authority and the [employer] and [employer] Act, this Virtual Visit was conducted with patient's (and/or legal guardian's) consent, to reduce the patient's risk of exposure to COVID-19 and provide necessary medical care. The patient (and/or legal guardian) has also been advised to contact this office for worsening conditions or problems, and seek emergency medical treatment and/or call 911 if deemed necessary. Patient identification was verified at the start of the visit: Yes    Total time spent for this encounter: Not billed by time    Services were provided through a video synchronous discussion virtually to substitute for in-person clinic visit. Patient and provider were located at their individual homes.     --MARIAELENA Polanco CNP on 10/19/2020 at 11:04 AM    An electronic signature was used to authenticate this note.      Discussed use, benefit, andside effects of prescribed medications. Barriers to medication compliance addressed. All patient questions answered. Pt voiced understanding.      Electronically signedby MARIAELENA Turk CNP on 10/19/2020 at 11:04 AM

## 2021-02-10 ENCOUNTER — HOSPITAL ENCOUNTER (OUTPATIENT)
Age: 46
Setting detail: SPECIMEN
Discharge: HOME | End: 2021-02-10
Payer: MEDICAID

## 2021-02-10 DIAGNOSIS — Z00.00 ANNUAL PHYSICAL EXAM: ICD-10-CM

## 2021-02-10 LAB
ABSOLUTE EOS #: 0.08 K/UL (ref 0–0.4)
ABSOLUTE IMMATURE GRANULOCYTE: 0 K/UL (ref 0–0.3)
ABSOLUTE LYMPH #: 1.72 K/UL (ref 1–4.8)
ABSOLUTE MONO #: 0.45 K/UL (ref 0.1–0.8)
BASOPHILS # BLD: 1 % (ref 0–2)
BASOPHILS ABSOLUTE: 0.04 K/UL (ref 0–0.2)
DIFFERENTIAL TYPE: ABNORMAL
EOSINOPHILS RELATIVE PERCENT: 2 % (ref 1–4)
HCT VFR BLD CALC: 38 % (ref 36.3–47.1)
HEMOGLOBIN: 11.7 G/DL (ref 11.9–15.1)
IMMATURE GRANULOCYTES: 0 %
LYMPHOCYTES # BLD: 42 % (ref 24–44)
MCH RBC QN AUTO: 26.8 PG (ref 25.2–33.5)
MCHC RBC AUTO-ENTMCNC: 30.8 G/DL (ref 28.4–34.8)
MCV RBC AUTO: 87.2 FL (ref 82.6–102.9)
MONOCYTES # BLD: 11 % (ref 1–7)
MORPHOLOGY: ABNORMAL
NRBC AUTOMATED: 0 PER 100 WBC
PDW BLD-RTO: 22.1 % (ref 11.8–14.4)
PLATELET # BLD: 225 K/UL (ref 138–453)
PLATELET ESTIMATE: ABNORMAL
PMV BLD AUTO: 12.9 FL (ref 8.1–13.5)
RBC # BLD: 4.36 M/UL (ref 3.95–5.11)
RBC # BLD: ABNORMAL 10*6/UL
SEG NEUTROPHILS: 44 % (ref 36–66)
SEGMENTED NEUTROPHILS ABSOLUTE COUNT: 1.81 K/UL (ref 1.8–7.7)
WBC # BLD: 4.1 K/UL (ref 3.5–11.3)
WBC # BLD: ABNORMAL 10*3/UL

## 2021-02-11 LAB
ALBUMIN SERPL-MCNC: 4.2 G/DL (ref 3.5–5.2)
ALBUMIN/GLOBULIN RATIO: 2 (ref 1–2.5)
ALP BLD-CCNC: 45 U/L (ref 35–104)
ALT SERPL-CCNC: 12 U/L (ref 5–33)
ANION GAP SERPL CALCULATED.3IONS-SCNC: 6 MMOL/L (ref 9–17)
AST SERPL-CCNC: 17 U/L
BILIRUB SERPL-MCNC: <0.1 MG/DL (ref 0.3–1.2)
BUN BLDV-MCNC: 10 MG/DL (ref 6–20)
BUN/CREAT BLD: ABNORMAL (ref 9–20)
CALCIUM SERPL-MCNC: 9.1 MG/DL (ref 8.6–10.4)
CHLORIDE BLD-SCNC: 108 MMOL/L (ref 98–107)
CHOLESTEROL/HDL RATIO: 3.3
CHOLESTEROL: 178 MG/DL
CO2: 28 MMOL/L (ref 20–31)
CREAT SERPL-MCNC: 0.52 MG/DL (ref 0.5–0.9)
ESTIMATED AVERAGE GLUCOSE: 97 MG/DL
GFR AFRICAN AMERICAN: >60 ML/MIN
GFR NON-AFRICAN AMERICAN: >60 ML/MIN
GFR SERPL CREATININE-BSD FRML MDRD: ABNORMAL ML/MIN/{1.73_M2}
GFR SERPL CREATININE-BSD FRML MDRD: ABNORMAL ML/MIN/{1.73_M2}
GLUCOSE FASTING: 95 MG/DL (ref 70–99)
HBA1C MFR BLD: 5 % (ref 4–6)
HDLC SERPL-MCNC: 54 MG/DL
LDL CHOLESTEROL: 108 MG/DL (ref 0–130)
POTASSIUM SERPL-SCNC: 4 MMOL/L (ref 3.7–5.3)
SODIUM BLD-SCNC: 142 MMOL/L (ref 135–144)
TOTAL PROTEIN: 6.3 G/DL (ref 6.4–8.3)
TRIGL SERPL-MCNC: 81 MG/DL
TSH SERPL DL<=0.05 MIU/L-ACNC: 1.28 MIU/L (ref 0.3–5)
VLDLC SERPL CALC-MCNC: NORMAL MG/DL (ref 1–30)

## 2021-02-18 ENCOUNTER — OFFICE VISIT (OUTPATIENT)
Dept: FAMILY MEDICINE CLINIC | Age: 46
End: 2021-02-18
Payer: MEDICAID

## 2021-02-18 VITALS
SYSTOLIC BLOOD PRESSURE: 138 MMHG | HEART RATE: 91 BPM | TEMPERATURE: 97.7 F | WEIGHT: 119.4 LBS | DIASTOLIC BLOOD PRESSURE: 81 MMHG | OXYGEN SATURATION: 98 % | HEIGHT: 64 IN | BODY MASS INDEX: 20.38 KG/M2

## 2021-02-18 DIAGNOSIS — L29.9 ITCHING: ICD-10-CM

## 2021-02-18 DIAGNOSIS — F32.1 MODERATE MAJOR DEPRESSION (HCC): ICD-10-CM

## 2021-02-18 DIAGNOSIS — M19.90 ARTHRITIS: ICD-10-CM

## 2021-02-18 DIAGNOSIS — F42.4 SKIN-PICKING DISORDER: Primary | ICD-10-CM

## 2021-02-18 PROCEDURE — G8420 CALC BMI NORM PARAMETERS: HCPCS | Performed by: NURSE PRACTITIONER

## 2021-02-18 PROCEDURE — 4004F PT TOBACCO SCREEN RCVD TLK: CPT | Performed by: NURSE PRACTITIONER

## 2021-02-18 PROCEDURE — G8427 DOCREV CUR MEDS BY ELIG CLIN: HCPCS | Performed by: NURSE PRACTITIONER

## 2021-02-18 PROCEDURE — G8484 FLU IMMUNIZE NO ADMIN: HCPCS | Performed by: NURSE PRACTITIONER

## 2021-02-18 PROCEDURE — 99214 OFFICE O/P EST MOD 30 MIN: CPT | Performed by: NURSE PRACTITIONER

## 2021-02-18 RX ORDER — BUPRENORPHINE HYDROCHLORIDE AND NALOXONE HYDROCHLORIDE DIHYDRATE 8; 2 MG/1; MG/1
TABLET SUBLINGUAL
COMMUNITY
Start: 2021-02-09

## 2021-02-18 RX ORDER — SERTRALINE HYDROCHLORIDE 100 MG/1
100 TABLET, FILM COATED ORAL DAILY
Qty: 90 TABLET | Refills: 0 | Status: SHIPPED | OUTPATIENT
Start: 2021-02-18 | End: 2021-03-18 | Stop reason: SDUPTHER

## 2021-02-18 RX ORDER — RISPERIDONE 1 MG/1
1 TABLET, FILM COATED ORAL NIGHTLY
Qty: 30 TABLET | Refills: 1 | Status: SHIPPED | OUTPATIENT
Start: 2021-02-18 | End: 2021-02-18 | Stop reason: ALTCHOICE

## 2021-02-18 RX ORDER — MELOXICAM 7.5 MG/1
7.5 TABLET ORAL DAILY
Qty: 30 TABLET | Refills: 3 | Status: SHIPPED | OUTPATIENT
Start: 2021-02-18 | End: 2021-05-26 | Stop reason: SDUPTHER

## 2021-02-18 SDOH — ECONOMIC STABILITY: INCOME INSECURITY: HOW HARD IS IT FOR YOU TO PAY FOR THE VERY BASICS LIKE FOOD, HOUSING, MEDICAL CARE, AND HEATING?: NOT HARD AT ALL

## 2021-02-18 SDOH — ECONOMIC STABILITY: TRANSPORTATION INSECURITY
IN THE PAST 12 MONTHS, HAS THE LACK OF TRANSPORTATION KEPT YOU FROM MEDICAL APPOINTMENTS OR FROM GETTING MEDICATIONS?: NO

## 2021-02-18 ASSESSMENT — ENCOUNTER SYMPTOMS
EYE DISCHARGE: 0
SHORTNESS OF BREATH: 0
CHEST TIGHTNESS: 0
VOMITING: 0
ABDOMINAL PAIN: 0
RHINORRHEA: 0
DIARRHEA: 0
CONSTIPATION: 0
COUGH: 0

## 2021-02-18 NOTE — TELEPHONE ENCOUNTER
Micaela Lomeli from [address] calling to notify Siobhan Perez that Suboxone and Risperdal have a drug interaction-QT prolongation, and additive CNS depressant. Micaela Lomeli wanted to know if Siobhan Perez wanted to continue patient on Risperdal or change Rx. Please advise.

## 2021-02-18 NOTE — PROGRESS NOTES
[address] FAMILY MEDICINE, [facility]. [facility] [address] [address]  Dept: [phone]  Dept Fax: : [phone]  [address] [place] [address] Fax: [phone]     Visit type: Established patient    Reason for Visit: Follow-up (lab results ), Medication Refill (zoloft pended), Rash (all over began in feb 2020), and Health Maintenance (tdap / pnuemo )      Assessment and Plan       1. Skin-picking disorder  2. Itching  3. Moderate major depression ([address]  4. Arthritis  -     meloxicam (MOBIC) 7.5 MG tablet; Take 1 tablet by mouth daily, Disp-30 tablet, R-3Normal  Was given risperidone however this interacts with her suboxone, so will increase her zoloft  Also will add mobic for arthritis  All labs reviewed with patient     Return in about 1 month (around 3/18/2021) for arthritis, new med x 2 . Subjective       Is here for follow up labs    Has started following with specialty for suboxone  Addiction tramadol, after bebeto passed away (her ) it got worse   Evlyn Brinks in [place]     Depression  Onset years ago when she had her daughter  Was on zoloft in the past and it worked well, but they lost her insurance        Review of Systems   Constitutional: Negative for activity change, appetite change and fever. HENT: Negative for congestion, ear pain and rhinorrhea. Eyes: Negative for discharge and visual disturbance. Respiratory: Negative for cough, chest tightness and shortness of breath. Cardiovascular: Negative for chest pain and palpitations. Gastrointestinal: Negative for abdominal pain, constipation, diarrhea and vomiting. Genitourinary: Negative for difficulty urinating and hematuria. Musculoskeletal: Positive for arthralgias, joint swelling and myalgias. Skin: Positive for rash. Hair is falling out  rash   Neurological: Positive for headaches. Negative for dizziness, weakness and numbness.    Psychiatric/Behavioral: Positive for agitation, behavioral Conjunctiva/sclera: Conjunctivae normal.   Neck:      Musculoskeletal: Normal range of motion and neck supple. No spinous process tenderness. Thyroid: No thyromegaly. Trachea: Trachea normal.   Cardiovascular:      Rate and Rhythm: Normal rate and regular rhythm. Heart sounds: Normal heart sounds. No murmur. No friction rub. No gallop. Pulmonary:      Effort: Pulmonary effort is normal.      Breath sounds: Normal breath sounds. Abdominal:      General: Bowel sounds are normal.      Palpations: Abdomen is soft. Tenderness: There is no abdominal tenderness. Musculoskeletal: Normal range of motion. Skin:     General: Skin is warm and dry. Findings: Rash present. No erythema. Comments: Scabs in different stages of healing consistent with skin picking disorder, noted to torso, arms and legs and face, some hyperpigmentation    Neurological:      Mental Status: She is alert and oriented to person, place, and time. Psychiatric:         Speech: Speech normal.         Behavior: Behavior normal.         Thought Content:  Thought content normal.           Data Reviewed and Summarized       Labs:   Lab Results   Component Value Date    CHOL 178 02/10/2021     Lab Results   Component Value Date    TRIG 81 02/10/2021     Lab Results   Component Value Date    HDL 54 02/10/2021     Lab Results   Component Value Date    LDLCHOLESTEROL 108 02/10/2021     Lab Results   Component Value Date    VLDL NOT REPORTED 02/10/2021     Lab Results   Component Value Date    CHOLHDLRATIO 3.3 02/10/2021     Lab Results   Component Value Date    WBC 4.1 02/10/2021    HGB 11.7 (L) 02/10/2021    HCT 38.0 02/10/2021    MCV 87.2 02/10/2021     02/10/2021    LABLYMP 38.0 03/16/2019    LYMPHOPCT 42 02/10/2021    RBC 4.36 02/10/2021    MCH 26.8 02/10/2021    MCHC 30.8 02/10/2021    RDW 22.1 (H) 02/10/2021         Lab Results   Component Value Date     02/10/2021    K 4.0 02/10/2021     (H) 02/10/2021    CO2 28 02/10/2021    BUN 10 02/10/2021    CREATININE 0.52 02/10/2021    GLUCOSE 102 03/16/2019    CALCIUM 9.1 02/10/2021    PROT 6.3 (L) 02/10/2021    LABALBU 4.2 02/10/2021    BILITOT <0.10 (L) 02/10/2021    ALKPHOS 45 02/10/2021    AST 17 02/10/2021    ALT 12 02/10/2021    LABGLOM >60 02/10/2021    GFRAA >60 02/10/2021       Lab Results   Component Value Date    LABA1C 5.0 02/10/2021     Lab Results   Component Value Date    EAG 97 02/10/2021         Imaging/Testing:        MARIAELENA Carrasco - CNP

## 2021-02-20 PROBLEM — M19.90 ARTHRITIS: Status: ACTIVE | Noted: 2021-02-20

## 2021-03-18 ENCOUNTER — TELEPHONE (OUTPATIENT)
Dept: FAMILY MEDICINE CLINIC | Age: 46
End: 2021-03-18

## 2021-03-18 DIAGNOSIS — F32.1 MODERATE MAJOR DEPRESSION (HCC): ICD-10-CM

## 2021-03-18 RX ORDER — SERTRALINE HYDROCHLORIDE 100 MG/1
100 TABLET, FILM COATED ORAL DAILY
Qty: 90 TABLET | Refills: 0 | Status: SHIPPED | OUTPATIENT
Start: 2021-03-18 | End: 2021-03-29 | Stop reason: ALTCHOICE

## 2021-03-18 NOTE — PROGRESS NOTES
Psychiatric/Behavioral: Positive for agitation, behavioral problems (skin picking disorder), decreased concentration and sleep disturbance. Negative for dysphoric mood, hallucinations, self-injury and suicidal ideas. The patient is nervous/anxious. The patient is not hyperactive. Allergies   Allergen Reactions    Codeine Other (See Comments)     headache       Outpatient Medications Prior to Visit   Medication Sig Dispense Refill    buprenorphine-naloxone (SUBOXONE) 8-2 MG SUBL SL tablet place 1 tablet under the tongue and ALLOW to dissolve once daily      meloxicam (MOBIC) 7.5 MG tablet Take 1 tablet by mouth daily 30 tablet 3    OLIVE LEAF PO Take by mouth      Sulfur 3 % BAR Apply topically      hydrocortisone (WESTCORT) 0.2 % cream Apply topically 2 times daily. (Patient taking differently: PRN) 45 g 0    sertraline (ZOLOFT) 50 MG tablet take 1 tablet by mouth once daily      sertraline (ZOLOFT) 100 MG tablet Take 1 tablet by mouth daily 90 tablet 0    sertraline (ZOLOFT) 100 MG tablet Take 1 tablet by mouth daily 90 tablet 0     No facility-administered medications prior to visit. Past Medical History:   Diagnosis Date    Moderate major depression ([address] 9/3/2020        Social History     Tobacco Use    Smoking status: Current Every Day Smoker     Packs/day: 0.50     Years: 10.00     Pack years: 5.00     Types: Cigarettes    Smokeless tobacco: Never Used   Substance Use Topics    Alcohol use: No        Past Surgical History:   Procedure Laterality Date     SECTION      TONSILLECTOMY         Family History   Problem Relation Age of Onset    High Blood Pressure Mother     Osteoporosis Mother     High Blood Pressure Father        Objective       There were no vitals taken for this visit. Physical Exam  Constitutional:       General: She is not in acute distress. Appearance: She is well-developed. Interventions: She is not intubated.   HENT:      Head: Normocephalic and atraumatic. Right Ear: External ear normal.      Left Ear: External ear normal.   Eyes:      General: Lids are normal.      Conjunctiva/sclera: Conjunctivae normal.   Neck:      Musculoskeletal: Normal range of motion. Pulmonary:      Effort: No tachypnea, bradypnea, prolonged expiration, respiratory distress or retractions. She is not intubated. Skin:     Coloration: Skin is not pale. Findings: No erythema or rash. Neurological:      Mental Status: She is alert and oriented to person, place, and time. Psychiatric:         Attention and Perception: Attention and perception normal.         Mood and Affect: Mood and affect normal.         Speech: Speech normal.         Behavior: Behavior normal. Behavior is cooperative. Thought Content: Thought content normal.         Cognition and Memory: Cognition and memory normal.         Judgment: Judgment normal.           Data Reviewed and Summarized       Labs:     Imaging/Testing:    Patient was seen today via Telehealth by agreement and consent in light of the current COVID-19 pandemic. I used the following Telehealth technology: Audio and video capabilities. This patient encounter is appropriate and reasonable under the circumstances given the patient's particular presentation at this time. The patient has been advised of the potential risks and limitations of this mode of treatment (including but not limited to the absence of in-person examination) and has agreed to be treated in a remote fashion in spite of them. Any and all of the patient's/patient's family's questions on this issue have been answered and I have made no promises or guarantees to the patient. The patient has also been advised to contact this office for worsening conditions or problems, and seek emergency medical treatment and/or call 911 if the patient deems either necessary. The patient stated that they [place] the state of [place].  If the patient is a minor, permission has been obtained by the parent or guardian for the patient to receive medical care at this visit.       MARIAELENA Hobbs - CNP

## 2021-03-29 ENCOUNTER — VIRTUAL VISIT (OUTPATIENT)
Dept: FAMILY MEDICINE CLINIC | Age: 46
End: 2021-03-29
Payer: MEDICAID

## 2021-03-29 DIAGNOSIS — F32.1 MODERATE MAJOR DEPRESSION (HCC): Primary | ICD-10-CM

## 2021-03-29 DIAGNOSIS — F42.4 SKIN-PICKING DISORDER: ICD-10-CM

## 2021-03-29 PROCEDURE — G8427 DOCREV CUR MEDS BY ELIG CLIN: HCPCS | Performed by: NURSE PRACTITIONER

## 2021-03-29 PROCEDURE — 99214 OFFICE O/P EST MOD 30 MIN: CPT | Performed by: NURSE PRACTITIONER

## 2021-03-29 RX ORDER — VENLAFAXINE HYDROCHLORIDE 37.5 MG/1
37.5 CAPSULE, EXTENDED RELEASE ORAL DAILY
Qty: 30 CAPSULE | Refills: 1 | Status: SHIPPED | OUTPATIENT
Start: 2021-03-29 | End: 2021-06-08 | Stop reason: ALTCHOICE

## 2021-03-29 RX ORDER — VENLAFAXINE HYDROCHLORIDE 37.5 MG/1
37.5 CAPSULE, EXTENDED RELEASE ORAL DAILY
Qty: 30 CAPSULE | Refills: 1 | Status: SHIPPED | OUTPATIENT
Start: 2021-03-29 | End: 2021-03-29 | Stop reason: SDUPTHER

## 2021-03-29 ASSESSMENT — ENCOUNTER SYMPTOMS
RHINORRHEA: 0
COUGH: 0
DIARRHEA: 0
CHEST TIGHTNESS: 0
SHORTNESS OF BREATH: 0
CONSTIPATION: 0
ABDOMINAL PAIN: 0
EYE DISCHARGE: 0
VOMITING: 0

## 2021-05-11 ENCOUNTER — HOSPITAL ENCOUNTER (OUTPATIENT)
Age: 46
Setting detail: SPECIMEN
Discharge: HOME | End: 2021-05-11
Payer: MEDICAID

## 2021-05-11 ENCOUNTER — OFFICE VISIT (OUTPATIENT)
Dept: FAMILY MEDICINE CLINIC | Age: 46
End: 2021-05-11
Payer: MEDICAID

## 2021-05-11 VITALS
BODY MASS INDEX: 21.63 KG/M2 | OXYGEN SATURATION: 97 % | DIASTOLIC BLOOD PRESSURE: 79 MMHG | SYSTOLIC BLOOD PRESSURE: 130 MMHG | HEART RATE: 74 BPM | WEIGHT: 126 LBS | TEMPERATURE: 97.3 F

## 2021-05-11 DIAGNOSIS — F42.4 SKIN-PICKING DISORDER: ICD-10-CM

## 2021-05-11 DIAGNOSIS — Z13.31 POSITIVE DEPRESSION SCREENING: ICD-10-CM

## 2021-05-11 DIAGNOSIS — M19.90 ARTHRITIS: ICD-10-CM

## 2021-05-11 DIAGNOSIS — F32.1 MODERATE MAJOR DEPRESSION (HCC): Primary | ICD-10-CM

## 2021-05-11 PROCEDURE — 99214 OFFICE O/P EST MOD 30 MIN: CPT | Performed by: NURSE PRACTITIONER

## 2021-05-11 PROCEDURE — G8427 DOCREV CUR MEDS BY ELIG CLIN: HCPCS | Performed by: NURSE PRACTITIONER

## 2021-05-11 PROCEDURE — G8431 POS CLIN DEPRES SCRN F/U DOC: HCPCS | Performed by: NURSE PRACTITIONER

## 2021-05-11 RX ORDER — ARIPIPRAZOLE 5 MG/1
5 TABLET ORAL DAILY
Qty: 30 TABLET | Refills: 3 | Status: SHIPPED | OUTPATIENT
Start: 2021-05-11 | End: 2021-06-28 | Stop reason: SDUPTHER

## 2021-05-11 ASSESSMENT — ENCOUNTER SYMPTOMS
SHORTNESS OF BREATH: 0
ABDOMINAL PAIN: 0
CONSTIPATION: 0
VOMITING: 0
COUGH: 0
DIARRHEA: 0
CHEST TIGHTNESS: 0
RHINORRHEA: 0
EYE DISCHARGE: 0

## 2021-05-11 ASSESSMENT — PATIENT HEALTH QUESTIONNAIRE - PHQ9
SUM OF ALL RESPONSES TO PHQ9 QUESTIONS 1 & 2: 6
SUM OF ALL RESPONSES TO PHQ QUESTIONS 1-9: 17
7. TROUBLE CONCENTRATING ON THINGS, SUCH AS READING THE NEWSPAPER OR WATCHING TELEVISION: 3
5. POOR APPETITE OR OVEREATING: 1
2. FEELING DOWN, DEPRESSED OR HOPELESS: 3
1. LITTLE INTEREST OR PLEASURE IN DOING THINGS: 3
6. FEELING BAD ABOUT YOURSELF - OR THAT YOU ARE A FAILURE OR HAVE LET YOURSELF OR YOUR FAMILY DOWN: 1
3. TROUBLE FALLING OR STAYING ASLEEP: 3

## 2021-05-11 NOTE — PROGRESS NOTES
[address] FAMILY MEDICINE, [facility]. [facility] [address]  Dept: [phone]  Dept Fax: [phone]: [phone]  [facility] [address] Fax: [phone]     Visit type: Established patient    Reason for Visit: Follow-up (6wk mood / new med - pt states she does not notice a different ) and Health Maintenance (tdap / pnuemo - declines )      Assessment and Plan       1. Moderate major depression (HCC)  -     ARIPiprazole (ABILIFY) 5 MG tablet; Take 1 tablet by mouth daily, Disp-30 tablet, R-3Normal  2. Skin-picking disorder  -     ARIPiprazole (ABILIFY) 5 MG tablet; Take 1 tablet by mouth daily, Disp-30 tablet, R-3Normal  -     Lyme Disease AB, Blood; Future  3. Arthritis  4. Positive depression screening  -     Positive Screen for Clinical Depression with a Documented Follow-up Plan   -     ARIPiprazole (ABILIFY) 5 MG tablet; Take 1 tablet by mouth daily, Disp-30 tablet, R-3Normal    Will add abilify  Return in about 22 days (around 6/2/2021) for fu new med . Subjective         Hx of addiction    specialty for suboxone  Addiction tramadol, after bebeto passed away (her ) it got worse   Benuel Ayala in [place]     Depression  Onset years ago when she had her daughter  Was on zoloft in the past and it worked well, but they lost her insurance   Was started on zoloft again - has not noticed a lot of difference   Headache every day  Is doing daily vitamin       Update 5/11/2021 States that she is still seeing fuzz the falls from the winsome and lands on her. Is picking skin. Does not like to wear short sleeves because of it. Is insistent on checking for lymes disease. Feel anxious. Is not sleeping well. Taking melatonin. Review of Systems   Constitutional: Negative for activity change, appetite change and fever. HENT: Negative for congestion, ear pain and rhinorrhea. Eyes: Negative for discharge and visual disturbance.    Respiratory: Negative for cough, chest tightness and shortness of breath. Cardiovascular: Negative for chest pain and palpitations. Gastrointestinal: Negative for abdominal pain, constipation, diarrhea and vomiting. Genitourinary: Negative for difficulty urinating and hematuria. Musculoskeletal: Positive for arthralgias, joint swelling and myalgias. Skin: Positive for rash. Hair is falling out  rash   Neurological: Positive for headaches. Negative for dizziness, weakness and numbness. Psychiatric/Behavioral: Positive for agitation, behavioral problems (skin picking disorder), decreased concentration and sleep disturbance. Negative for dysphoric mood, hallucinations, self-injury and suicidal ideas. The patient is nervous/anxious. The patient is not hyperactive. Allergies   Allergen Reactions    Codeine Other (See Comments)     headache       Outpatient Medications Prior to Visit   Medication Sig Dispense Refill    buprenorphine-naloxone (SUBOXONE) 8-2 MG SUBL SL tablet place 1 tablet under the tongue and ALLOW to dissolve once daily      meloxicam (MOBIC) 7.5 MG tablet Take 1 tablet by mouth daily 30 tablet 3    OLIVE LEAF PO Take by mouth      Sulfur 3 % BAR Apply topically      hydrocortisone (WESTCORT) 0.2 % cream Apply topically 2 times daily. (Patient taking differently: PRN) 45 g 0    venlafaxine (EFFEXOR XR) 37.5 MG extended release capsule Take 1 capsule by mouth daily (Patient not taking: Reported on 2021) 30 capsule 1     No facility-administered medications prior to visit.         Past Medical History:   Diagnosis Date    Moderate major depression ([facility] 9/3/2020        Social History     Tobacco Use    Smoking status: Current Every Day Smoker     Packs/day: 0.50     Years: 10.00     Pack years: 5.00     Types: Cigarettes    Smokeless tobacco: Never Used   Substance Use Topics    Alcohol use: No        Past Surgical History:   Procedure Laterality Date     SECTION      TONSILLECTOMY         Family

## 2021-05-13 LAB — LYME ANTIBODY: 0.34

## 2021-05-18 ENCOUNTER — TELEPHONE (OUTPATIENT)
Dept: FAMILY MEDICINE CLINIC | Age: 46
End: 2021-05-18

## 2021-05-18 NOTE — TELEPHONE ENCOUNTER
Pt scheduled a New Pt appointment with you in November but would like to be put on the cancellation list for something sooner. A friend referred her to Dr. Linda Gonzalez.

## 2021-05-25 DIAGNOSIS — M19.90 ARTHRITIS: ICD-10-CM

## 2021-05-26 RX ORDER — MELOXICAM 7.5 MG/1
7.5 TABLET ORAL DAILY
Qty: 30 TABLET | Refills: 3 | Status: SHIPPED | OUTPATIENT
Start: 2021-05-26

## 2021-06-23 ENCOUNTER — TELEPHONE (OUTPATIENT)
Dept: FAMILY MEDICINE CLINIC | Age: 46
End: 2021-06-23

## 2021-06-23 NOTE — TELEPHONE ENCOUNTER
----- Message from Dillon Dasilva MA sent at 6/23/2021  2:52 PM EDT -----  Subject: Message to Provider    QUESTIONS  Information for Provider? Patient request a copy of her recent blood work   results be sent to her dermatologist [address] [place] [address] dermatology in [facility] F# does not have.   ---------------------------------------------------------------------------  --------------  CALL BACK INFO  What is the best way for the office to contact you? OK to leave message on   voicemail  Preferred Call Back Phone Number? [phone]  ---------------------------------------------------------------------------  --------------  SCRIPT ANSWERS  Relationship to Patient?  Self

## 2021-06-28 ENCOUNTER — VIRTUAL VISIT (OUTPATIENT)
Dept: FAMILY MEDICINE CLINIC | Age: 46
End: 2021-06-28
Payer: MEDICAID

## 2021-06-28 DIAGNOSIS — F32.1 MODERATE MAJOR DEPRESSION (HCC): Primary | ICD-10-CM

## 2021-06-28 DIAGNOSIS — F42.4 SKIN-PICKING DISORDER: ICD-10-CM

## 2021-06-28 DIAGNOSIS — Z13.31 POSITIVE DEPRESSION SCREENING: ICD-10-CM

## 2021-06-28 PROCEDURE — G8420 CALC BMI NORM PARAMETERS: HCPCS | Performed by: NURSE PRACTITIONER

## 2021-06-28 PROCEDURE — 99214 OFFICE O/P EST MOD 30 MIN: CPT | Performed by: NURSE PRACTITIONER

## 2021-06-28 PROCEDURE — G8427 DOCREV CUR MEDS BY ELIG CLIN: HCPCS | Performed by: NURSE PRACTITIONER

## 2021-06-28 PROCEDURE — 4004F PT TOBACCO SCREEN RCVD TLK: CPT | Performed by: NURSE PRACTITIONER

## 2021-06-28 RX ORDER — ARIPIPRAZOLE 10 MG/1
10 TABLET ORAL DAILY
Qty: 30 TABLET | Refills: 5 | Status: SHIPPED | OUTPATIENT
Start: 2021-06-28 | End: 2021-10-28 | Stop reason: SDUPTHER

## 2021-06-28 NOTE — PROGRESS NOTES
Tayla Quiroga (:  1975) is a 54 y.o. female,Established patient, here for evaluation of the following chief complaint(s): Follow-up (depression)         ASSESSMENT/PLAN:  1. Moderate major depression (HCC)  -     ARIPiprazole (ABILIFY) 10 MG tablet; Take 1 tablet by mouth daily, Disp-30 tablet, R-5Normal  2. Skin-picking disorder  -     ARIPiprazole (ABILIFY) 10 MG tablet; Take 1 tablet by mouth daily, Disp-30 tablet, R-5Normal  3. Positive depression screening  -     ARIPiprazole (ABILIFY) 10 MG tablet; Take 1 tablet by mouth daily, Disp-30 tablet, R-5Normal    Med increase, keep fu with Dr. Linda Gonzalez  Patient is also following with suboxone clinic   Is also following with allergist for skin picking disorder- they are going to do a prick test in august, and also they did a skin biopsy of one of her lesions   If issues come up before she changes PCP can reach out to office. Return if symptoms worsen or fail to improve. SUBJECTIVE/OBJECTIVE:    Hx of addiction    specialty for suboxone  Addiction tramadol, after bebeto passed away (her ) it got worse   Cassandra Brooke in [place]     Depression  Onset years ago when she had her daughter  Was on zoloft in the past and it worked well, but they lost her insurance   Was started on zoloft again - has not noticed a lot of difference   Headache every day  Is doing daily vitamin       Update 2021 States that she is still seeing fuzz the falls from the winsome and lands on her. Is picking skin. Does not like to wear short sleeves because of it. Is insistent on checking for lymes disease. Feel anxious. Is not sleeping well. Taking melatonin. Update 2021 Is taking Abilify and prozac, but panic attacks have been better. Still having some panic attacks. Sleep has improved. Did go to dermatologist. States that she had a biopsy of recent spot. Was given hydroxyzine and started taking at night. Is feeling down moments during the week 4/7 days.        Review of Systems   Constitutional: Positive for fatigue. Skin: Positive for rash. Psychiatric/Behavioral: Positive for agitation, behavioral problems, decreased concentration and sleep disturbance. The patient is nervous/anxious. Patient-Reported Vitals 6/28/2021   Patient-Reported Weight 130 lbs   Patient-Reported Height 5 4        Physical Exam    [INSTRUCTIONS:  \"[x]\" Indicates a positive item  \"[]\" Indicates a negative item  -- DELETE ALL ITEMS NOT EXAMINED]    Constitutional: [x] Appears well-developed and well-nourished [x] No apparent distress      [] Abnormal -     Mental status: [x] Alert and awake  [x] Oriented to person/place/time [x] Able to follow commands    [] Abnormal -     Eyes:   EOM    [x]  Normal    [] Abnormal -   Sclera  [x]  Normal    [] Abnormal -          Discharge [x]  None visible   [] Abnormal -     HENT: [x] Normocephalic, atraumatic  [] Abnormal -   [x] Mouth/Throat: Mucous membranes are moist    External Ears [x] Normal  [] Abnormal -    Neck: [x] No visualized mass [] Abnormal -     Pulmonary/Chest: [x] Respiratory effort normal   [x] No visualized signs of difficulty breathing or respiratory distress        [] Abnormal -      Musculoskeletal:   [x] Normal gait with no signs of ataxia         [x] Normal range of motion of neck        [] Abnormal -     Neurological:        [x] No Facial Asymmetry (Cranial nerve 7 motor function) (limited exam due to video visit)          [x] No gaze palsy        [] Abnormal -          Skin:        [x] No significant exanthematous lesions or discoloration noted on facial skin         [] Abnormal -            Psychiatric:       [x] Normal Affect [] Abnormal -        [x] No Hallucinations    Other pertinent observable physical exam findings:-                Rhea TAYLOR Lazaro, was evaluated through a synchronous (real-time) audio-video encounter. The patient (or guardian if applicable) is aware that this is a billable service.  Verbal consent to proceed has been obtained within the past 12 months. The visit was conducted pursuant to the emergency declaration under the [address] [place] [address] and the [employer] General Act. Patient identification was verified, and a caregiver was present when appropriate. The patient was located in a state where the provider was credentialed to provide care.       An electronic signature was used to authenticate this note.    --MARIAELENA Maldonado - CNP

## 2021-10-28 ENCOUNTER — OFFICE VISIT (OUTPATIENT)
Dept: FAMILY MEDICINE CLINIC | Age: 46
End: 2021-10-28
Payer: MEDICAID

## 2021-10-28 VITALS
TEMPERATURE: 97.1 F | WEIGHT: 136.8 LBS | RESPIRATION RATE: 16 BRPM | OXYGEN SATURATION: 94 % | BODY MASS INDEX: 23.35 KG/M2 | DIASTOLIC BLOOD PRESSURE: 81 MMHG | HEIGHT: 64 IN | HEART RATE: 73 BPM | SYSTOLIC BLOOD PRESSURE: 138 MMHG

## 2021-10-28 DIAGNOSIS — Z13.31 POSITIVE DEPRESSION SCREENING: ICD-10-CM

## 2021-10-28 DIAGNOSIS — Z00.00 WELL ADULT EXAM: ICD-10-CM

## 2021-10-28 DIAGNOSIS — R03.0 ELEVATED BLOOD PRESSURE READING: ICD-10-CM

## 2021-10-28 DIAGNOSIS — M19.90 ARTHRITIS: ICD-10-CM

## 2021-10-28 DIAGNOSIS — Z00.00 ENCOUNTER FOR WELL ADULT EXAM WITHOUT ABNORMAL FINDINGS: Primary | ICD-10-CM

## 2021-10-28 DIAGNOSIS — F32.1 MODERATE MAJOR DEPRESSION (HCC): ICD-10-CM

## 2021-10-28 PROCEDURE — 99386 PREV VISIT NEW AGE 40-64: CPT | Performed by: FAMILY MEDICINE

## 2021-10-28 PROCEDURE — G8484 FLU IMMUNIZE NO ADMIN: HCPCS | Performed by: FAMILY MEDICINE

## 2021-10-28 RX ORDER — FLUOXETINE 10 MG/1
20 CAPSULE ORAL DAILY
Qty: 180 CAPSULE | Refills: 1 | Status: SHIPPED | OUTPATIENT
Start: 2021-10-28

## 2021-10-28 RX ORDER — ARIPIPRAZOLE 10 MG/1
10 TABLET ORAL DAILY
Qty: 90 TABLET | Refills: 1 | Status: SHIPPED | OUTPATIENT
Start: 2021-10-28

## 2021-10-28 ASSESSMENT — ENCOUNTER SYMPTOMS
VOMITING: 0
SHORTNESS OF BREATH: 0
BLOOD IN STOOL: 0
TROUBLE SWALLOWING: 0
NAUSEA: 0
COUGH: 0
DIARRHEA: 0
EYE PAIN: 0
ABDOMINAL PAIN: 0
CONSTIPATION: 0

## 2021-10-28 NOTE — PROGRESS NOTES
Health Maintenance Due   Topic Date Due    Pneumococcal 0-64 years Vaccine (1 of 2 - PPSV23) Never done    COVID-19 Vaccine (1) Never done    Cervical cancer screen  Never done    DTaP/Tdap/Td vaccine (1 - Tdap) 07/04/2018    Colon cancer screen colonoscopy  Never done    Flu vaccine (1) 09/01/2021

## 2021-10-28 NOTE — PROGRESS NOTES
[address] [facility]. [address]  [facility] [address]  Dept: [phone]  Loc: Mindy Darnell is a 51 y.o. female who presents today for:  Chief Complaint   Patient presents with    New Patient     Establish       Goals    None         HPI:     HPI  Here to establish    Not sleeping  again - did stop the antidepressant for about 3 weeks and and just restarted it again - did run out of meds - had covid and was told the depression was normal - the problems she had before started back up only worse than before    Hearing the sound again and nightmares on the abilify it did go away in the past but it was better    The [employer] through [facility] and will go to the third phase    Takes the meloxicam at night for the inflammation - if she does not take it her fingers may be swollen    Broke out in a rash and then figured out she was allergic to things and thinks maybe it was the allergies now    Mom and dad both with arthrits and her mom did have some depression at one point, uncle did have depression - he passed away  No question data found.     Past Medical History:   Diagnosis Date    Moderate major depression ([address] 9/3/2020      Past Surgical History:   Procedure Laterality Date     SECTION      TONSILLECTOMY       Family History   Problem Relation Age of Onset    High Blood Pressure Mother     Osteoporosis Mother     High Blood Pressure Father      Social History     Tobacco Use    Smoking status: Current Every Day Smoker     Packs/day: 0.50     Years: 10.00     Pack years: 5.00     Types: Cigarettes    Smokeless tobacco: Never Used   Substance Use Topics    Alcohol use: No      Current Outpatient Medications   Medication Sig Dispense Refill    ARIPiprazole (ABILIFY) 10 MG tablet Take 1 tablet by mouth daily 90 tablet 1    FLUoxetine (PROZAC) 10 MG capsule Take 2 capsules by mouth daily 180 capsule 1    meloxicam (MOBIC) 7.5 MG tablet Take 1 tablet by mouth daily 30 tablet 3    buprenorphine-naloxone (SUBOXONE) 8-2 MG SUBL SL tablet place 1 tablet under the tongue and ALLOW to dissolve once daily       No current facility-administered medications for this visit. Allergies   Allergen Reactions    Codeine Other (See Comments)     headache       Health Maintenance   Topic Date Due    Pneumococcal 0-64 years Vaccine (1 of 2 - PPSV23) Never done    COVID-19 Vaccine (1) Never done    Cervical cancer screen  Never done    DTaP/Tdap/Td vaccine (1 - Tdap) 07/04/2018    Colon cancer screen colonoscopy  Never done    Flu vaccine (1) 09/01/2021    Lipid screen  02/10/2026    Hepatitis A vaccine  Aged Out    Hepatitis B vaccine  Aged Out    Hib vaccine  Aged Out    Meningococcal (ACWY) vaccine  Aged Out    Hepatitis C screen  Discontinued    HIV screen  Discontinued       Subjective:      Review of Systems   Constitutional: Negative for chills, fatigue and fever. HENT: Negative for ear pain, postnasal drip and trouble swallowing. Eyes: Negative for pain and visual disturbance. Respiratory: Negative for cough and shortness of breath. Cardiovascular: Negative for chest pain and palpitations. Gastrointestinal: Negative for abdominal pain, blood in stool, constipation, diarrhea, nausea and vomiting. Genitourinary: Negative for difficulty urinating. Skin: Negative for rash. Neurological: Negative for headaches. Psychiatric/Behavioral: Negative for agitation. Objective:     Vitals:    10/28/21 0853   BP: 138/81   Site: Left Upper Arm   Position: Sitting   Cuff Size: Medium Adult   Pulse: 73   Resp: 16   Temp: 97.1 °F (36.2 °C)   SpO2: 94%   Weight: 136 lb 12.8 oz (62.1 kg)   Height: 5' 4\" (1.626 m)       Body mass index is 23.48 kg/m².     Wt Readings from Last 3 Encounters:   10/28/21 136 lb 12.8 oz (62.1 kg)   05/11/21 126 lb (57.2 kg)   02/18/21 119 lb 6.4 oz (54.2 kg)     BP Readings from Last 3 Encounters:   10/28/21 138/81   05/11/21 130/79   02/18/21 138/81       Physical Exam  Vitals and nursing note reviewed. Constitutional:       General: She is not in acute distress. Appearance: She is well-developed. She is not diaphoretic. HENT:      Head: Normocephalic and atraumatic. Right Ear: External ear normal.      Left Ear: External ear normal.   Eyes:      General: No scleral icterus. Right eye: No discharge. Left eye: No discharge. Conjunctiva/sclera: Conjunctivae normal.   Cardiovascular:      Rate and Rhythm: Normal rate and regular rhythm. Heart sounds: Normal heart sounds. No murmur heard. Pulmonary:      Effort: Pulmonary effort is normal.      Breath sounds: Normal breath sounds. Musculoskeletal:      Cervical back: Normal range of motion. Skin:     General: Skin is warm and dry. Findings: No erythema or rash. Neurological:      Mental Status: She is alert and oriented to person, place, and time. Cranial Nerves: No cranial nerve deficit. Psychiatric:         Behavior: Behavior normal.         Thought Content: Thought content normal.         Judgment: Judgment normal.           Lab Results   Component Value Date    WBC 6.5 08/20/2021    HGB 11.8 (L) 08/20/2021    HCT 35.2 08/20/2021     08/20/2021    CHOL 178 02/10/2021    TRIG 81 02/10/2021    HDL 54 02/10/2021    AST 19 08/20/2021     02/10/2021    K 4.0 02/10/2021     (H) 02/10/2021    CREATININE 0.52 02/10/2021    BUN 10 02/10/2021    CO2 28 02/10/2021    TSH 1.28 02/10/2021    GLUF 95 02/10/2021    LABA1C 5.0 02/10/2021    LABGLOM >60 02/10/2021    CALCIUM 9.1 02/10/2021       Imaging Results:    No results found. Assessment:      Diagnosis Orders   1. Well adult exam     2. Elevated blood pressure reading     3. Moderate major depression (HCC)  ARIPiprazole (ABILIFY) 10 MG tablet    [facility] Gaurav carrera, [address], [address], [facility], [facility]   4. Arthritis     5. Positive depression screening  ARIPiprazole (ABILIFY) 10 MG tablet       Plan: Will stay on the same dose of the abilify    Will increase the prozac to 20mg and ok to take it in the am    Will keep up the sobuxone with your provider    Will decrease to 10mg of the melatonin from the 40mg you are taking to see if it helps the nightmares    Can add some hydroxyzine if needed even before the next apt    Can see you back in a month or 6 weeks can order some bloodwork then and see you back in Feb with the blood work    Will take the vit d 2000 a day    Please add a B vitamin to your regimen - it should be a B complex that is time released but NOT a super complex as that adds vitamin C and you will eliminate the B vitamins more quickly and they won't be around in your system long enough to help. Will discuss more vaccines next visit  Can get old records      The 10-year ASCVD risk score (Jackie Wong, et al., 2013) is: 3%    Values used to calculate the score:      Age: 51 years      Sex: Female      Is Non- : No      Diabetic: No      Tobacco smoker: Yes      Systolic Blood Pressure: 069 mmHg      Is BP treated: No      HDL Cholesterol: 54 mg/dL      Total Cholesterol: 178 mg/dL        No follow-ups on file. Orders Placed:  Orders Placed This Encounter   Procedures   [address] [place] [address] [address], [facility]     Medications Prescribed:  Orders Placed This Encounter   Medications    ARIPiprazole (ABILIFY) 10 MG tablet     Sig: Take 1 tablet by mouth daily     Dispense:  90 tablet     Refill:  1    FLUoxetine (PROZAC) 10 MG capsule     Sig: Take 2 capsules by mouth daily     Dispense:  180 capsule     Refill:  1       Future Appointments   Date Time Provider Uyen Watson   12/6/2021  8:40 AM Ke Resendez MD [facility]       Patient given educational materials - see patient instructions.   Discussed use, benefit, and sideeffects of prescribed medications. All patient questions answered. Pt voiced understanding. Reviewed health maintenance. Instructed to continue current medications, diet and exercise. Patient agreed with treatment plan. Follow up as directed. I was present for the key portions of the exam and history and confirmed all areas of the note with the patient, staff and the student.       Electronically signed by Jenny Huffman DO on 10/28/2021 at 9:23 AM

## 2021-11-08 ENCOUNTER — VIRTUAL VISIT (OUTPATIENT)
Dept: BEHAVIORAL/MENTAL HEALTH CLINIC | Age: 46
End: 2021-11-08
Payer: MEDICAID

## 2021-11-08 DIAGNOSIS — F41.1 GENERALIZED ANXIETY DISORDER: ICD-10-CM

## 2021-11-08 DIAGNOSIS — F33.2 SEVERE EPISODE OF RECURRENT MAJOR DEPRESSIVE DISORDER, WITHOUT PSYCHOTIC FEATURES (HCC): Primary | ICD-10-CM

## 2021-11-08 PROCEDURE — 4004F PT TOBACCO SCREEN RCVD TLK: CPT | Performed by: SOCIAL WORKER

## 2021-11-08 PROCEDURE — 90791 PSYCH DIAGNOSTIC EVALUATION: CPT | Performed by: SOCIAL WORKER

## 2021-11-08 ASSESSMENT — PATIENT HEALTH QUESTIONNAIRE - PHQ9
4. FEELING TIRED OR HAVING LITTLE ENERGY: 3
SUM OF ALL RESPONSES TO PHQ QUESTIONS 1-9: 21
7. TROUBLE CONCENTRATING ON THINGS, SUCH AS READING THE NEWSPAPER OR WATCHING TELEVISION: 3
SUM OF ALL RESPONSES TO PHQ9 QUESTIONS 1 & 2: 6
SUM OF ALL RESPONSES TO PHQ QUESTIONS 1-9: 23
6. FEELING BAD ABOUT YOURSELF - OR THAT YOU ARE A FAILURE OR HAVE LET YOURSELF OR YOUR FAMILY DOWN: 3
2. FEELING DOWN, DEPRESSED OR HOPELESS: 3
5. POOR APPETITE OR OVEREATING: 3
3. TROUBLE FALLING OR STAYING ASLEEP: 3
9. THOUGHTS THAT YOU WOULD BE BETTER OFF DEAD, OR OF HURTING YOURSELF: 2
8. MOVING OR SPEAKING SO SLOWLY THAT OTHER PEOPLE COULD HAVE NOTICED. OR THE OPPOSITE, BEING SO FIGETY OR RESTLESS THAT YOU HAVE BEEN MOVING AROUND A LOT MORE THAN USUAL: 0
SUM OF ALL RESPONSES TO PHQ QUESTIONS 1-9: 23
1. LITTLE INTEREST OR PLEASURE IN DOING THINGS: 3

## 2021-11-08 ASSESSMENT — COLUMBIA-SUICIDE SEVERITY RATING SCALE - C-SSRS
6. HAVE YOU EVER DONE ANYTHING, STARTED TO DO ANYTHING, OR PREPARED TO DO ANYTHING TO END YOUR LIFE?: NO
1. WITHIN THE PAST MONTH, HAVE YOU WISHED YOU WERE DEAD OR WISHED YOU COULD GO TO SLEEP AND NOT WAKE UP?: YES
2. HAVE YOU ACTUALLY HAD ANY THOUGHTS OF KILLING YOURSELF?: NO

## 2021-11-08 ASSESSMENT — ANXIETY QUESTIONNAIRES
7. FEELING AFRAID AS IF SOMETHING AWFUL MIGHT HAPPEN: 3
2. NOT BEING ABLE TO STOP OR CONTROL WORRYING: 3
3. WORRYING TOO MUCH ABOUT DIFFERENT THINGS: 3
GAD7 TOTAL SCORE: 17
6. BECOMING EASILY ANNOYED OR IRRITABLE: 2
1. FEELING NERVOUS, ANXIOUS, OR ON EDGE: 3
4. TROUBLE RELAXING: 3
5. BEING SO RESTLESS THAT IT IS HARD TO SIT STILL: 0

## 2021-11-08 NOTE — PATIENT INSTRUCTIONS
1. The Stress Response and How It Can Affect You   The stress response, or fight or flight response is the emergency reaction system of the body. It is there to keep you safe in emergencies. The stress response includes physical and thought responses to your perception of various situations. When the stress response is turned on, your body may release substances like adrenaline and cortisol. Your organs are programmed to respond in certain ways to situations that are viewed as challenging or threatening. The stress response can work against you. You can turn it on when you dont really need it and, as a result, perceive something as an emergency when its really not. It can turn on when you are just thinking about past or future events. Harmless, chronic conditions can be intensified by the stress response activating too often, with too much intensity, or for too long. Stress responses can be different for different individuals. Below is a list of some common stress related responses people have. ([place] the responses you have had in the last 2 weeks.)                                                                                                 Physical Responses   Muscle aches   Insomnia   ? Heart rate   Headache   Weight gain   Nausea   Constipation   Dry mouth   Muscle twitching  Weight loss   Low energy   Weakness   Tight chest   Diarrhea   Dizziness   Trembling   Stomach cramps  Chills    Hot flashes   Sweating   Pounding heart  Choking feeling  Chest pain   Leg cramps   Numb hands/feet Dry throat   Appetite change  Face flushing   ? Blood pressure  Light-headedness  Feeling faint        Trouble swallowing   Rash ?    Urination   Neck pain             Tingling hands/feet                                                                                             Emotional and Thought Responses   Restlessness   Agitation   Insecurity             Worthlessness   Anxiety   Stress    Depression Hopelessness   Guilt    Defensiveness  Anger            Racing thoughts   Nightmares   Intense thinking  Sensitivity            Expecting the worst   Numbness   Lack of motivation  Mood swings             Forgetfulness   ? Concentration  Rigidity              Preoccupation  Intolerance                                                Behavioral Responses   Avoidance   Withdrawal   Neglect   ? Alcohol use    Smoking   ? Eating   Arguing        Poor appearance   ? Spending   Poor hygiene   ? Eating   Seeking reassurance   Nail biting   Skin picking   ? Talking         ? Body checking   Sexual problems  Foot tapping   Fidgeting Rapid walking    ? Exercise   Teeth clenching           Multitasking   Aggressive speaking       ? Fun activities  ? Sleeping      ? Relaxing activities     Seeking information     The parasympathetic nervous system in your body is designed to turn on your bodys relaxation response. Your behaviors and thinking can keep your bodys natural relaxation response from operating at its best.     Getting your body to relax on a daily basis for at least brief periods can help decrease unpleasant stress responses. Learning to relax your body, through specific breathing and relaxation exercises as well as by minimizing stressful thinking, can help your bodys natural relaxation system be more effective. 2. Depression: Facts, Myths & Tips for Feeling Better    Facts    Depression is very common  Nearly 20% of the [place] population experiences a significant depression during their lifetime. Depression is treatable  Because depression affects so many, and can have such a powerful and negative impact on life, there has been an enormous amount of research conducted on how to reduce symptoms and improve functioning. As a result, we now know there are behaviors YOU can engage in to make yourself feel better.   Depression is not a weakness  People suffer from depression for a variety of reasons, biological, environmental and behavioral.  Research indicates that [employer] is NOT one of the reasons people become depressed. Depression is not something you are powerless against  Evidence suggests that you can directly impact the intensity and duration of depression by what you do and by altering the way you think about certain things. The Downward Spiral    Depression often begins as a drop in mood due to an environmental or biological trigger that makes people feel less like being active. Being less active, in turn, often causes people to experience an even lower mood and feel even less like being active, and so the cycle begins. How can I start feeling better? The first and best way to reverse the downward cycle is to get active! Your body produces its own anti-depressants every time you exercise or do something pleasurable. Regular exercise is one of the very best ways to improve your mood. In fact some studies have shown that a solid exercise program is as effective as psychotherapy or anti-depressant medication for some people. *See physical activity section of handout    Force yourself to do something you found pleasurable before depression. This may be different for everyone and it doesnt matter if its gardening, playing bridge, walking, reading a novel, or simply talking to a close friend. What matters is that YOU find the activity pleasurable! Even if you dont feel like doing something pleasurable for yourself, DO IT ANYWAY. We call this the fake it until you make it principle. Educate yourself! Often people feel powerless against medical conditions because they do not understand what is happening in their body. Just by reading this handout you know more than most people about depression. Knowledge is power when you can apply it, and make yourself feel better. *See recommended reading list at the bottom of this handout\"    Begin to notice unhealthy and unhelpful thoughts!  In addition to how we behave, how we think influences our mood directly. Notice recurrent or alarming thoughts that have an impact on your mood. Ask yourself is this type of thinking helping me or hurting me?  if your answer is it's hurting me here are some things you can do: *see disputation of negative thoughts section of handout  - Challenge the negative thought. Is it truly accurate? Wheres the proof? Become your own  and collect the facts.  - Reframe the negative thought. How can I think differently about this problem, situation, or view of myself? Allow yourself to view a situation from more than one angle, how might my spouse, friend, or someone I admire view this same problem?  - Use the best friend scenario. How would you help your best friend if he or she was having these same thoughts? Would you criticize him or her as harshly as you criticize yourself? Remember, YOU know YOU better than anyone else. You likely know what kinds of activities, thoughts and reinforcement you respond to. Doing whats easiest and most doable is the key. Pick 1 or 2 things that are easy and get started feeling better TODAY! * Use the following handout sections to guide you through behavioral and thinking exercises to help you manage your depressive symptoms, improve your functioning and to begin living your life well again. Recommended readings on managing depression    - Feeling Good by Dr. Simran Pollard. Martinez     - Mind over Belinda-Virgilio by Paradise Ortiz and [facility] by Dr. Chinedu Mckeon by Dr. Sydney Gomez      Disputation:  Challenging Upsetting Thinking    Examine your thoughts for key words:  1. must, need, got to, have to, should (unrealistic standards)  2. never, always, completely, totally, all everything, everyone (predictions /  labeling)  3. awful, terrible, horrible, unbearable, disaster, worst ever (labeling / predictions)  4. jerk, slob, creep, hypocrite, bully, stupid (labels)  Dispute or question the accuracy of the questionable thoughts. 1. Am I upsetting myself unnecessarily? How can I see this another way? 2. Is my thinking working for or against me? How could I view this in a less upsetting way? 3. What am I demanding must happen? What do I want or prefer, rather than need? 4. Am I making something too terrible? Is it really that awful? What would be so terrible about that? 5. Am I labeling a person? What is the action that I dont like? 6. Whats untrue about my thoughts? How can I stick to the facts? Whats the proof for what I am thinking or believing about this? 7. Am I using extreme, black-and-white language? What less extreme words might be more accurate? 8. Am I fortune telling or mind reading in a way that gets me upset or unhappy? What are the odds (percent chance -- e.g., there is a 5% chance. ..) that it will really turn out the way Im thinking or imagining? 9. What are my options in this situation? How would I like to respond? 10. Create more moderate, helpful, or realistic statements to replace the upsetting ones. 11. Have I had any experiences that show that this thought might not be totally true? 12. If my best friend or someone I loved had this thought, what would I tell them? 15. If my best friend or someone I loved knew I was thinking this thought, what would they say to me? What evidence would they point out to me that would suggest that my thought is not completely true? 14. Are there strengths in me or positives in the situation that I am ignoring? Am I underestimating my ability to cope with unfortunate circumstances? 15. When I am not feeling this way, do I think about this situation any differently? How?  16. Have I been in this type of situation before? What happened? What have I learned from prior experiences that could help me now?   17. Five years from now, if I look back on this situation, will I look at it any differently? Will I focus on any different part of my experience? 25. Am I blaming myself for something over which I do not have complete control? 3. Deep Breathing Exercises      Exercise 1:  The Stimulating Breath (also called the Shahida Breath)   Its aim is to raise energy level and increase alertness. - Inhale and exhale rapidly through your nose, keeping your mouth closed but relaxed. Your breaths in and out should be equal in duration, but as short as possible. This is a noisy breathing exercise. - Try for three in-and-out breath cycles per second. This produces a quick movement of the diaphragm, suggesting a shahida. Breathe normally after each cycle. - Do not do for more than 15 seconds on your first try. Each time you practice the Stimulating Breath, you can increase your time by five seconds or so, until you reach a full minute. If done properly, you may feel invigorated, comparable to the heightened awareness you feel after a good workout. You should feel the effort at the back of the neck, the diaphragm, the chest and the abdomen. Try this breathing exercise the next time you need an energy boost and feel yourself reaching for a cup of coffee. Exercise 2:  The 4-7-8 (or Relaxing Breath) Exercise  This exercise is utterly simple, takes almost no time, requires no equipment and can be done anywhere. Although you can do the exercise in any position, sit with your back straight while learning the exercise. Place the tip of your tongue against the ridge of tissue just behind your upper front teeth, and keep it there through the entire exercise. You will be exhaling through your mouth around your tongue; try pursing your lips slightly if this seems awkward.  Exhale completely through your mouth, making a whoosh sound.  Close your mouth and inhale quietly through your nose to a mental count of four.    Hold your breath for a count of seven.   Exhale completely through your mouth, making a whoosh sound to a count of eight.  This is one breath. Now inhale again and repeat the cycle three more times for a total of four breaths. Note that you always inhale quietly through your nose and exhale audibly through your mouth. The tip of your tongue stays in position the whole time. Exhalation takes twice as long as inhalation. The absolute time you spend on each phase is not important; the ratio of 4:7:8 is important. If you have trouble holding your breath, speed the exercise up but keep to the ratio of 4:7:8 for the three phases. With practice you can slow it all down and get used to inhaling and exhaling more and more deeply. This exercise is a natural tranquilizer for the nervous system. Unlike tranquilizing drugs, which are often effective when you first take them but then lose their power over time, this exercise is subtle when you first try it but gains in power with repetition and practice. Do it at least twice a day. You cannot do it too frequently. Do NOT do more than 4 breaths at one time for the first month of practice. Later, if you wish, you can extend it to eight breaths. If you feel a little lightheaded when you first breathe this way, do not be concerned; it will pass. Once you develop this technique by practicing it every day, it will be a very useful tool that you will always have with you. Use it whenever anything upsetting happens - before you react. Use it whenever you are aware of internal tension. Use it to help you fall asleep. This exercise cannot be recommended too highly. Everyone can benefit from it. Exercise 3: Meditation exercise  Breath Counting  If you want to get a feel for this challenging work, try your hand at breath counting, a deceptively simple technique. Sit in a comfortable position with the spine straight and head inclined slightly forward. Gently close your eyes and take a few deep breaths.  Then let the breath come naturally without trying to influence it. Ideally it will be quiet and slow, but depth and rhythm may vary.  To begin the exercise, count \"one\" to yourself as you exhale.  The next time you exhale, count \"two,\" and so on up to John. \"   Then begin a new cycle, counting \"one\" on the next exhalation. Never count higher than \"five,\" and count only when you exhale. You will know your attention has wandered when you find yourself up to \"eight,\" \"12,\" even \"19. \"  Try to do 10 minutes of this form of meditation. 4.Pt will promote effective self care habits daily/weekly-rest, relaxation, stress management, supportive relationships, increased physical outlets, engagement in enjoyable activities. 5. Pt will follow up with GALDINO Lyn  6. Pt will familiarize self with [facility]. 7. Pt was advised of indications of depressive symptoms and instructed to monitor if symptoms worsen or interfere with daily functioning, to consider following-up with either your primary care team or a behavioral health provider for possible counseling or medication management. If suicidal thoughts are experienced, call 911. An additional 24/7 resource is the [facility] at [phone] ([phone]).

## 2021-11-08 NOTE — PROGRESS NOTES
with pt in establishing symptoms first onset was following the traumatic loss of her  three years ago. Pt acknowledged that stress and grief have made it difficult to to manage her self care needs, which has impacting her current symptoms. The current situation was processed with pt in examining thoughts, feelings, and impairment on daily functioning. Pt indicated symptoms of poor sleep, difficulties with concentration, depressed mood, sadness, moments of being on the brink of tears, feeling anxious/edgy, irritability, and ruminating on negative/worrisome thoughts. Pt was guided in exploring areas of behavioral change, development of effective coping strategies, and assessing the management of environmental factors influencing the problem. The PHQ-9 was administered and pt scored 23, indicating Severe major depression. The ONESIMO 7 scored at 17, indicating Severe Generalized anxiety. Pt denied plans or desires of suicide, but acknowledged fleeting thoughts that she would be be better off dead. The C-SSRS was  completed\" and pt presented at low risk of suicide, with strong protective factors. Crisis supports were preventively discussed. Pt was advised of indications of depressive symptoms and instructed to monitor if symptoms worsen or interfere with daily functioning, to consider following-up with either your primary care team or a behavioral health provider for possible counseling or medication management. If suicidal thoughts are experienced, call 911. An additional 24/7 resource is the [facility] [address] at [phone] ([phone]). Pt will attend a follow up appointment next week.     O:  MSE:    Appearance    cooperative, crying  Appetite abnormal: poor  Sleep disturbance Yes  Fatigue Yes  Loss of pleasure Yes  Impulsive behavior No  Speech    normal rate  Mood    Anxious  Depressed  euthymic   Affect    depressed affect  Thought Content    intact  Thought Process    coherent  Associations logical connections  Insight    Fair  Judgment    Intact  Orientation    oriented to person, place, time, and general circumstances  Memory    recent and remote memory intact  Attention/Concentration    intact  Morbid ideation No  Suicide Assessment    no suicidal ideation; fleeting thoughts that she would be better off dead, rated at low risk for suicide on C-SSRS, preventively discussed crisis supports for if symptoms worsen. History:    TOBACCO:   reports that she has been smoking cigarettes. She has a 5.00 pack-year smoking history. She has never used smokeless tobacco.  ETOH:   reports no history of alcohol use. Family History:   Family History   Problem Relation Age of Onset    High Blood Pressure Mother     Osteoporosis Mother     High Blood Pressure Father        A:  Diagnosis:    1. Severe episode of recurrent major depressive disorder, without psychotic features ([facility]    2. Generalized anxiety disorder          Diagnosis Date    Moderate major depression ([facility] 9/3/2020       Plan: Pt will attend a follow up appointment next week to assess symptoms and evaluate effectiveness of coping skills. Pt interventions:  Provided handout on  depression, anxiety and stress, Trained in relaxation strategies and Discussed self-care (sleep, nutrition, rewarding activities, social support, exercise)    Pt Behavioral Change Plan:   1. Pt will read handouts regarding depression, anxiety, relaxation techniques, and self care. 2. Pt will practice self calming skills 2-3x's daily for 3-5 minutes. 3. Pt will promote effective self care habits daily/weekly-rest, relaxation, stress management, supportive relationships, increased physical outlets, engagement in enjoyable activities. 4. Pt will follow up with GALDINO Mike  5.   Pt was advised of indications of depressive symptoms and instructed to monitor if symptoms worsen or interfere with daily functioning, to consider following-up with either your primary care team or a behavioral health provider for possible counseling or medication management. If suicidal thoughts are experienced, call 911.

## 2021-11-15 ENCOUNTER — VIRTUAL VISIT (OUTPATIENT)
Dept: BEHAVIORAL/MENTAL HEALTH CLINIC | Age: 46
End: 2021-11-15

## 2021-11-15 DIAGNOSIS — F33.2 SEVERE EPISODE OF RECURRENT MAJOR DEPRESSIVE DISORDER, WITHOUT PSYCHOTIC FEATURES (HCC): Primary | ICD-10-CM

## 2021-11-15 DIAGNOSIS — F41.1 GENERALIZED ANXIETY DISORDER: ICD-10-CM

## 2021-11-15 PROCEDURE — 99999 PR OFFICE/OUTPT VISIT,PROCEDURE ONLY: CPT | Performed by: SOCIAL WORKER

## 2021-11-15 NOTE — PROGRESS NOTES
Behavioral Health Consultation  GALDNIO Jones  11/15/2021  8:32 AM EST      [facility]  [facility]  Dept: [phone]  Dept Fax: [phone]      TELEHEALTH EVALUATION -- Audio/Visual (During KFLTQ-15 public health emergency)  This visit was performed via a synchronous telecommunication system. Pt location: Home ([address] [place] [address])  Provider location: Home Office ([address] [place] [address])  Pt notified that this was a virtual visit and that we will submit a claim for reimbursement to their insurance company. They were made aware that any copays, coinsurance amounts, or other amounts not covered will be their responsibility. Pt accepted? yes1  Pursuant to the emergency declaration under the [address] [place] [address], [facility] waiver authority and the [employer] Act, this Virtual  Visit was conducted, with patient's consent, to reduce the patient's risk of exposure to COVID-19 and provide continuity of care for an established patient. Services were provided through a video synchronous discussion virtually to substitute for in-person clinic visit. Time spent with Patient: appointment rescheduled  This is patient's first  [facility] appointment. Reason for Consult:    Chief Complaint   Patient presents with    Anxiety    Depression     Referring Provider: No referring provider defined for this encounter. Pt provided informed consent for the behavioral health program. Discussed with patient model of service to include the limits of confidentiality (i.e. abuse reporting, suicide intervention, etc.) and short-term intervention focused approach. Pt indicated understanding. Feedback given to PCP. S:  Appointment was rescheduled to 11/17 @ 2pm due to change in work schedule.     O:  MSE:    Appearance    cooperative  Appetite abnormal: poor  Sleep disturbance Yes  Fatigue Yes  Loss of pleasure Yes  Impulsive behavior No  Speech    normal rate  Mood    Anxious  Depressed  euthymic   Affect    depressed affect  Thought Content    intact  Thought Process    coherent  Associations    logical connections  Insight    Fair  Judgment    Intact  Orientation    oriented to person, place, time, and general circumstances  Memory    recent and remote memory intact  Attention/Concentration    intact  Morbid ideation No  Suicide Assessment    no suicidal ideation; fleeting thoughts that she would be better off dead, rated at low risk for suicide on C-SSRS, preventively discussed crisis supports for if symptoms worsen. History:    TOBACCO:   reports that she has been smoking cigarettes. She has a 5.00 pack-year smoking history. She has never used smokeless tobacco.  ETOH:   reports no history of alcohol use. Family History:   Family History   Problem Relation Age of Onset    High Blood Pressure Mother     Osteoporosis Mother     High Blood Pressure Father        A:  Diagnosis:    1. Severe episode of recurrent major depressive disorder, without psychotic features ([facility]    2. Generalized anxiety disorder          Diagnosis Date    Moderate major depression ([facility] 9/3/2020       Plan: Pt will attend a follow up appointment next week to assess symptoms and evaluate effectiveness of coping skills. Pt interventions:  Provided handout on  depression, anxiety and stress, Trained in relaxation strategies and Discussed self-care (sleep, nutrition, rewarding activities, social support, exercise)    Pt Behavioral Change Plan:   1. Pt will read handouts regarding depression, anxiety, relaxation techniques, and self care. 2. Pt will practice self calming skills 2-3x's daily for 3-5 minutes. 3. Pt will promote effective self care habits daily/weekly-rest, relaxation, stress management, supportive relationships, increased physical outlets, engagement in enjoyable activities.   4. Pt will follow up with Rosalba Neri TIM-S  5. Pt was advised of indications of depressive symptoms and instructed to monitor if symptoms worsen or interfere with daily functioning, to consider following-up with either your primary care team or a behavioral health provider for possible counseling or medication management. If suicidal thoughts are experienced, call 911.

## 2021-11-17 ENCOUNTER — VIRTUAL VISIT (OUTPATIENT)
Dept: BEHAVIORAL/MENTAL HEALTH CLINIC | Age: 46
End: 2021-11-17
Payer: MEDICAID

## 2021-11-17 DIAGNOSIS — F41.1 GENERALIZED ANXIETY DISORDER: ICD-10-CM

## 2021-11-17 DIAGNOSIS — F33.2 SEVERE EPISODE OF RECURRENT MAJOR DEPRESSIVE DISORDER, WITHOUT PSYCHOTIC FEATURES (HCC): Primary | ICD-10-CM

## 2021-11-17 PROCEDURE — 4004F PT TOBACCO SCREEN RCVD TLK: CPT | Performed by: SOCIAL WORKER

## 2021-11-17 PROCEDURE — 90832 PSYTX W PT 30 MINUTES: CPT | Performed by: SOCIAL WORKER

## 2021-11-17 NOTE — PATIENT INSTRUCTIONS
1. The Stress Response and How It Can Affect You   The stress response, or fight or flight response is the emergency reaction system of the body. It is there to keep you safe in emergencies. The stress response includes physical and thought responses to your perception of various situations. When the stress response is turned on, your body may release substances like adrenaline and cortisol. Your organs are programmed to respond in certain ways to situations that are viewed as challenging or threatening. The stress response can work against you. You can turn it on when you dont really need it and, as a result, perceive something as an emergency when its really not. It can turn on when you are just thinking about past or future events. Harmless, chronic conditions can be intensified by the stress response activating too often, with too much intensity, or for too long. Stress responses can be different for different individuals. Below is a list of some common stress related responses people have. ([place] the responses you have had in the last 2 weeks.)                                                                                                 Physical Responses   Muscle aches   Insomnia   ? Heart rate   Headache   Weight gain   Nausea   Constipation   Dry mouth   Muscle twitching  Weight loss   Low energy   Weakness   Tight chest   Diarrhea   Dizziness   Trembling   Stomach cramps  Chills    Hot flashes   Sweating   Pounding heart  Choking feeling  Chest pain   Leg cramps   Numb hands/feet Dry throat   Appetite change  Face flushing   ? Blood pressure  Light-headedness  Feeling faint        Trouble swallowing   Rash ?    Urination   Neck pain             Tingling hands/feet                                                                                             Emotional and Thought Responses   Restlessness   Agitation   Insecurity             Worthlessness   Anxiety   Stress    Depression environmental and behavioral.  Research indicates that [employer] is NOT one of the reasons people become depressed. Depression is not something you are powerless against  Evidence suggests that you can directly impact the intensity and duration of depression by what you do and by altering the way you think about certain things. The Downward Spiral    Depression often begins as a drop in mood due to an environmental or biological trigger that makes people feel less like being active. Being less active, in turn, often causes people to experience an even lower mood and feel even less like being active, and so the cycle begins. How can I start feeling better? The first and best way to reverse the downward cycle is to get active! Your body produces its own anti-depressants every time you exercise or do something pleasurable. Regular exercise is one of the very best ways to improve your mood. In fact some studies have shown that a solid exercise program is as effective as psychotherapy or anti-depressant medication for some people. *See physical activity section of handout    Force yourself to do something you found pleasurable before depression. This may be different for everyone and it doesnt matter if its gardening, playing bridge, walking, reading a novel, or simply talking to a close friend. What matters is that YOU find the activity pleasurable! Even if you dont feel like doing something pleasurable for yourself, DO IT ANYWAY. We call this the fake it until you make it principle. Educate yourself! Often people feel powerless against medical conditions because they do not understand what is happening in their body. Just by reading this handout you know more than most people about depression. Knowledge is power when you can apply it, and make yourself feel better. *See recommended reading list at the bottom of this handout\"    Begin to notice unhealthy and unhelpful thoughts!  In addition to how we behave, how we think influences our mood directly. Notice recurrent or alarming thoughts that have an impact on your mood. Ask yourself is this type of thinking helping me or hurting me?  if your answer is it's hurting me here are some things you can do: *see disputation of negative thoughts section of handout  - Challenge the negative thought. Is it truly accurate? Wheres the proof? Become your own  and collect the facts.  - Reframe the negative thought. How can I think differently about this problem, situation, or view of myself? Allow yourself to view a situation from more than one angle, how might my spouse, friend, or someone I admire view this same problem?  - Use the best friend scenario. How would you help your best friend if he or she was having these same thoughts? Would you criticize him or her as harshly as you criticize yourself? Remember, YOU know YOU better than anyone else. You likely know what kinds of activities, thoughts and reinforcement you respond to. Doing whats easiest and most doable is the key. Pick 1 or 2 things that are easy and get started feeling better TODAY! * Use the following handout sections to guide you through behavioral and thinking exercises to help you manage your depressive symptoms, improve your functioning and to begin living your life well again. Recommended readings on managing depression    - Feeling Good by Dr. Madelin Bell. Martinez     - Mind over [place] by Javi Go and [facility] by Dr. Ariana Lara by Dr. Facundo Gomez      Disputation:  Challenging Upsetting Thinking    Examine your thoughts for key words:  1. must, need, got to, have to, should (unrealistic standards)  2. never, always, completely, totally, all everything, everyone (predictions /  labeling)  3. awful, terrible, horrible, unbearable, disaster, worst ever (labeling / predictions)  4. jerk, slob, creep, hypocrite, bully, stupid (labels)  Dispute or question the accuracy of the questionable thoughts. 1. Am I upsetting myself unnecessarily? How can I see this another way? 2. Is my thinking working for or against me? How could I view this in a less upsetting way? 3. What am I demanding must happen? What do I want or prefer, rather than need? 4. Am I making something too terrible? Is it really that awful? What would be so terrible about that? 5. Am I labeling a person? What is the action that I dont like? 6. Whats untrue about my thoughts? How can I stick to the facts? Whats the proof for what I am thinking or believing about this? 7. Am I using extreme, black-and-white language? What less extreme words might be more accurate? 8. Am I fortune telling or mind reading in a way that gets me upset or unhappy? What are the odds (percent chance -- e.g., there is a 5% chance. ..) that it will really turn out the way Im thinking or imagining? 9. What are my options in this situation? How would I like to respond? 10. Create more moderate, helpful, or realistic statements to replace the upsetting ones. 11. Have I had any experiences that show that this thought might not be totally true? 12. If my best friend or someone I loved had this thought, what would I tell them? 15. If my best friend or someone I loved knew I was thinking this thought, what would they say to me? What evidence would they point out to me that would suggest that my thought is not completely true? 14. Are there strengths in me or positives in the situation that I am ignoring? Am I underestimating my ability to cope with unfortunate circumstances? 15. When I am not feeling this way, do I think about this situation any differently? How?  16. Have I been in this type of situation before? What happened? What have I learned from prior experiences that could help me now?   17. Five years from now, if I look back on this situation, will I look at it any differently? Will I focus on any different part of my experience? 25. Am I blaming myself for something over which I do not have complete control? 3. Deep Breathing Exercises      Exercise 1:  The Stimulating Breath (also called the Shahida Breath)   Its aim is to raise energy level and increase alertness. - Inhale and exhale rapidly through your nose, keeping your mouth closed but relaxed. Your breaths in and out should be equal in duration, but as short as possible. This is a noisy breathing exercise. - Try for three in-and-out breath cycles per second. This produces a quick movement of the diaphragm, suggesting a shahida. Breathe normally after each cycle. - Do not do for more than 15 seconds on your first try. Each time you practice the Stimulating Breath, you can increase your time by five seconds or so, until you reach a full minute. If done properly, you may feel invigorated, comparable to the heightened awareness you feel after a good workout. You should feel the effort at the back of the neck, the diaphragm, the chest and the abdomen. Try this breathing exercise the next time you need an energy boost and feel yourself reaching for a cup of coffee. Exercise 2:  The 4-7-8 (or Relaxing Breath) Exercise  This exercise is utterly simple, takes almost no time, requires no equipment and can be done anywhere. Although you can do the exercise in any position, sit with your back straight while learning the exercise. Place the tip of your tongue against the ridge of tissue just behind your upper front teeth, and keep it there through the entire exercise. You will be exhaling through your mouth around your tongue; try pursing your lips slightly if this seems awkward.  Exhale completely through your mouth, making a whoosh sound.  Close your mouth and inhale quietly through your nose to a mental count of four.    Hold your breath for a count of seven.   Exhale completely through your mouth, making a whoosh sound to a count of eight.  This is one breath. Now inhale again and repeat the cycle three more times for a total of four breaths. Note that you always inhale quietly through your nose and exhale audibly through your mouth. The tip of your tongue stays in position the whole time. Exhalation takes twice as long as inhalation. The absolute time you spend on each phase is not important; the ratio of 4:7:8 is important. If you have trouble holding your breath, speed the exercise up but keep to the ratio of 4:7:8 for the three phases. With practice you can slow it all down and get used to inhaling and exhaling more and more deeply. This exercise is a natural tranquilizer for the nervous system. Unlike tranquilizing drugs, which are often effective when you first take them but then lose their power over time, this exercise is subtle when you first try it but gains in power with repetition and practice. Do it at least twice a day. You cannot do it too frequently. Do NOT do more than 4 breaths at one time for the first month of practice. Later, if you wish, you can extend it to eight breaths. If you feel a little lightheaded when you first breathe this way, do not be concerned; it will pass. Once you develop this technique by practicing it every day, it will be a very useful tool that you will always have with you. Use it whenever anything upsetting happens - before you react. Use it whenever you are aware of internal tension. Use it to help you fall asleep. This exercise cannot be recommended too highly. Everyone can benefit from it. Exercise 3: Meditation exercise  Breath Counting  If you want to get a feel for this challenging work, try your hand at breath counting, a deceptively simple technique. Sit in a comfortable position with the spine straight and head inclined slightly forward. Gently close your eyes and take a few deep breaths.  Then let the breath come naturally without trying to influence it. Ideally it will be quiet and slow, but depth and rhythm may vary.  To begin the exercise, count \"one\" to yourself as you exhale.  The next time you exhale, count \"two,\" and so on up to John. \"   Then begin a new cycle, counting \"one\" on the next exhalation. Never count higher than \"five,\" and count only when you exhale. You will know your attention has wandered when you find yourself up to \"eight,\" \"12,\" even \"19. \"  Try to do 10 minutes of this form of meditation. 4.Pt will promote effective self care habits daily/weekly-rest, relaxation, stress management, supportive relationships, increased physical outlets, engagement in enjoyable activities. 5. Pt will follow up with GALDINO Jones  6. Pt will familiarize self with [facility]. 7. Pt was advised of indications of depressive symptoms and instructed to monitor if symptoms worsen or interfere with daily functioning, to consider following-up with either your primary care team or a behavioral health provider for possible counseling or medication management. If suicidal thoughts are experienced, call 911. An additional 24/7 resource is the [facility] at [phone] ([phone]).

## 2021-11-18 NOTE — PROGRESS NOTES
Behavioral Health Consultation  GALDINO Paz  11/17/2021  2:00 PM [facility]      [facility]  [facility]  Dept: [phone]  Dept Fax: [phone]      TELEHEALTH EVALUATION -- Audio/Visual (During PGPHE-45 public health emergency)  This visit was performed via a synchronous telecommunication system. Pt location: Home ([address] [place] [address])  Provider location: Home Office ([address] [place] [address])  Pt notified that this was a virtual visit and that we will submit a claim for reimbursement to their insurance company. They were made aware that any copays, coinsurance amounts, or other amounts not covered will be their responsibility. Pt accepted? yes1  Pursuant to the emergency declaration under the [address] [place] [address], [facility] waiver authority and the [employer] and [employer] Act, this Virtual  Visit was conducted, with patient's consent, to reduce the patient's risk of exposure to COVID-19 and provide continuity of care for an established patient. Services were provided through a video synchronous discussion virtually to substitute for in-person clinic visit. Time spent with Patient: 29 minutes  This is patient's second [facility] appointment. Reason for Consult:    Chief Complaint   Patient presents with    Depression    Anxiety     Referring Provider: No referring provider defined for this encounter. Pt provided informed consent for the behavioral health program. Discussed with patient model of service to include the limits of confidentiality (i.e. abuse reporting, suicide intervention, etc.) and short-term intervention focused approach. Pt indicated understanding. Feedback given to PCP. S:  Pt assessed no change to symptoms of anxiety and depression. She determined this is evident of symptoms remaining at the same intensity and frequency.   Pt acknowledged that she is utilizing deep breathing exercises, which has helped in the moment. She examined no additional changes to her routine and/or self care and was open to explore things that get in the way of this. She conveyed insight on her delayed grieving of her  and challenges she has had towards healing and resiliency. Pt was guided in discussing her motivation for change and what this might look like in small steps. Pt was advised of indications of depressive symptoms and instructed to monitor if symptoms worsen or interfere with daily functioning, to consider following-up with either your primary care team or a behavioral health provider for possible counseling or medication management. If suicidal thoughts are experienced, call 911. An additional 24/7 resource is the [facility] at [phone] ([phone]). Pt will attend a follow up appointment in two weeks. O:  MSE:    Appearance    cooperative, crying  Appetite abnormal: poor  Sleep disturbance Yes  Fatigue Yes  Loss of pleasure Yes  Impulsive behavior No  Speech    normal rate  Mood    Anxious  Depressed  euthymic   Affect    depressed affect  Thought Content    intact  Thought Process    coherent  Associations    logical connections  Insight    Fair  Judgment    Intact  Orientation    oriented to person, place, time, and general circumstances  Memory    recent and remote memory intact  Attention/Concentration    intact  Morbid ideation No  Suicide Assessment    no suicidal ideation; fleeting thoughts that she would be better off dead, rated at low risk for suicide on C-SSRS, preventively discussed crisis supports for if symptoms worsen. History:    TOBACCO:   reports that she has been smoking cigarettes. She has a 5.00 pack-year smoking history. She has never used smokeless tobacco.  ETOH:   reports no history of alcohol use.   Family History:   Family History   Problem Relation Age of Onset    High Blood Pressure Mother     Osteoporosis Mother     High Blood Pressure Father        A:  Diagnosis:    1. Severe episode of recurrent major depressive disorder, without psychotic features ([facility]    2. Generalized anxiety disorder          Diagnosis Date    Moderate major depression ([facility] 9/3/2020       Plan: Pt will attend a follow up appointment in two weeks assess symptoms and evaluate effectiveness of coping skills. Pt interventions:  Provided handout on  depression, anxiety and stress, Trained in relaxation strategies and Discussed self-care (sleep, nutrition, rewarding activities, social support, exercise)    Pt Behavioral Change Plan:   1. Pt will read handouts regarding depression, anxiety, relaxation techniques, and self care. 2. Pt will practice self calming skills 2-3x's daily for 3-5 minutes. 3. Pt will promote effective self care habits daily/weekly-rest, relaxation, stress management, supportive relationships, increased physical outlets, engagement in enjoyable activities. 4. Pt will follow up with GALDINO Prescott  5. Pt was advised of indications of depressive symptoms and instructed to monitor if symptoms worsen or interfere with daily functioning, to consider following-up with either your primary care team or a behavioral health provider for possible counseling or medication management. If suicidal thoughts are experienced, call 911.

## 2022-07-15 ENCOUNTER — HOSPITAL ENCOUNTER (EMERGENCY)
Age: 47
Discharge: HOME | End: 2022-07-15
Payer: MEDICAID

## 2022-07-15 VITALS
BODY MASS INDEX: 23.05 KG/M2 | OXYGEN SATURATION: 98 % | SYSTOLIC BLOOD PRESSURE: 138 MMHG | DIASTOLIC BLOOD PRESSURE: 58 MMHG | HEIGHT: 64 IN | TEMPERATURE: 97.6 F | HEART RATE: 81 BPM | WEIGHT: 135 LBS | RESPIRATION RATE: 16 BRPM

## 2022-07-15 DIAGNOSIS — K04.7 DENTAL ABSCESS: Primary | ICD-10-CM

## 2022-07-15 DIAGNOSIS — S02.5XXB OPEN FRACTURE OF TOOTH, INITIAL ENCOUNTER: ICD-10-CM

## 2022-07-15 DIAGNOSIS — K02.9 DENTAL CARIES: ICD-10-CM

## 2022-07-15 PROCEDURE — 99213 OFFICE O/P EST LOW 20 MIN: CPT | Performed by: NURSE PRACTITIONER

## 2022-07-15 PROCEDURE — 99213 OFFICE O/P EST LOW 20 MIN: CPT

## 2022-07-15 RX ORDER — AMOXICILLIN 875 MG/1
875 TABLET, COATED ORAL 2 TIMES DAILY
Qty: 20 TABLET | Refills: 0 | Status: SHIPPED | OUTPATIENT
Start: 2022-07-15 | End: 2022-07-25

## 2022-07-15 RX ORDER — IBUPROFEN 400 MG/1
400 TABLET ORAL EVERY 6 HOURS PRN
COMMUNITY

## 2022-07-15 RX ORDER — CHLORHEXIDINE GLUCONATE 0.12 MG/ML
15 RINSE ORAL 2 TIMES DAILY
Qty: 420 ML | Refills: 0 | Status: SHIPPED | OUTPATIENT
Start: 2022-07-15 | End: 2022-07-29

## 2022-07-15 ASSESSMENT — PAIN SCALES - GENERAL: PAINLEVEL_OUTOF10: 4

## 2022-07-15 ASSESSMENT — ENCOUNTER SYMPTOMS
CHOKING: 0
RHINORRHEA: 0
COUGH: 0
FACIAL SWELLING: 1
SHORTNESS OF BREATH: 0
CHEST TIGHTNESS: 0
APNEA: 0
TRISMUS: 0
SINUS PRESSURE: 0
WHEEZING: 0
SORE THROAT: 0
STRIDOR: 0
SINUS PAIN: 0

## 2022-07-15 ASSESSMENT — PAIN DESCRIPTION - PAIN TYPE: TYPE: ACUTE PAIN

## 2022-07-15 ASSESSMENT — PAIN - FUNCTIONAL ASSESSMENT
PAIN_FUNCTIONAL_ASSESSMENT: PREVENTS OR INTERFERES SOME ACTIVE ACTIVITIES AND ADLS
PAIN_FUNCTIONAL_ASSESSMENT: 0-10

## 2022-07-15 ASSESSMENT — PAIN DESCRIPTION - ORIENTATION: ORIENTATION: RIGHT

## 2022-07-15 ASSESSMENT — PAIN DESCRIPTION - LOCATION: LOCATION: MOUTH

## 2022-07-15 ASSESSMENT — PAIN DESCRIPTION - DESCRIPTORS: DESCRIPTORS: DISCOMFORT;ACHING

## 2022-07-15 NOTE — ED PROVIDER NOTES
[facility]  Urgent Care Encounter      CHIEF COMPLAINT       Chief Complaint   Patient presents with    Dental Pain       Nurses Notes reviewed and I agree except as noted in the HPI. HISTORY OFPRESENT ILLNESS   Rhea Jenkins is a 51 y.o. The history is provided by the patient. No  was used. Dental Pain  Location:  Lower  Lower teeth location:  26/RL lateral incisor  Quality:  Aching, pressure-like and radiating  Severity:  Severe  Onset quality:  Sudden  Duration:  1 day  Timing:  Constant  Progression:  Worsening  Chronicity:  Recurrent  Context: abscess, dental caries, dental fracture, malocclusion and poor dentition    Context: cap still on, not crown fracture, not enamel fracture, filling intact, not intrusion, not recent dental surgery and not trauma    Relieved by:  NSAIDs and acetaminophen  Worsened by:  Jaw movement, pressure, touching, hot food/drink and cold food/drink  Ineffective treatments:  NSAIDs and acetaminophen  Associated symptoms: facial pain and facial swelling    Associated symptoms: no congestion, no difficulty swallowing, no drooling, no fever, no gum swelling, no headaches, no neck pain, no neck swelling, no oral bleeding, no oral lesions and no trismus    Risk factors: lack of dental care    Risk factors: no alcohol problem, no cancer, no chewing tobacco use, no diabetes, no immunosuppression, no periodontal disease and no smoking      REVIEW OF SYSTEMS     Review of Systems   Constitutional:  Negative for activity change, appetite change, chills, diaphoresis, fatigue and fever. HENT:  Positive for dental problem and facial swelling. Negative for congestion, drooling, mouth sores, rhinorrhea, sinus pressure, sinus pain and sore throat. Respiratory:  Negative for apnea, cough, choking, chest tightness, shortness of breath, wheezing and stridor. Cardiovascular:  Negative for chest pain, palpitations and leg swelling. moist.      Dentition: Abnormal dentition. Dental tenderness, gingival swelling, dental caries and dental abscesses present. Eyes:      Extraocular Movements: Extraocular movements intact. Conjunctiva/sclera: Conjunctivae normal.   Pulmonary:      Effort: Pulmonary effort is normal.   Musculoskeletal:         General: Normal range of motion. Cervical back: Normal range of motion. Skin:     General: Skin is warm. Neurological:      General: No focal deficit present. Mental Status: She is alert and oriented to person, place, and time. Psychiatric:         Mood and Affect: Mood normal.         Behavior: Behavior normal.         Thought Content: Thought content normal.         Judgment: Judgment normal.       DIAGNOSTIC RESULTS   Labs:No results found for this visit on 07/15/22. IMAGING:  No orders to display     URGENT CARE COURSE:     Vitals:    07/15/22 1644   BP: (!) 138/58   Pulse: 81   Resp: 16   Temp: 97.6 °F (36.4 °C)   TempSrc: Temporal   SpO2: 98%   Weight: 135 lb (61.2 kg)   Height: 5' 4\" (1.626 m)       Medications - No data to display  PROCEDURES:  None  FINAL IMPRESSION      1. Dental abscess    2. Dental caries    3. Open fracture of tooth, initial encounter        DISPOSITION/PLAN   Decision To Discharge         The patient was also advised to eat a soft diet, chew on the opposite side of pain, Use a soft bristle toothbrush and warm saltwater lavage after eating. The patient was advised to take the medication as directed. The patient was also advised to follow-up with a local dentist ASAP. We also discussed returning to the Emergency Department immediately if new or worsening symptoms occur. We have discussed the symptoms which are most concerning that necessitate immediate return.          PATIENT REFERRED TO:  DO Lyndsey Abraham [address] [place] [address] [phone]      As needed    [address] [place] [id number]  [phone]  Call     DISCHARGE MEDICATIONS:  Discharge Medication List as of 7/15/2022  5:04 PM        START taking these medications    Details   amoxicillin (AMOXIL) 875 MG tablet Take 1 tablet by mouth in the morning and 1 tablet before bedtime. Do all this for 10 days. , Disp-20 tablet, R-0Normal      chlorhexidine (PERIDEX) 0.12 % solution Take 15 mLs by mouth in the morning and 15 mLs before bedtime. Do all this for 14 days. , Disp-420 mL, R-0Normal           Discharge Medication List as of 7/15/2022  5:04 PM          MARIAELENA Rankin - MARIAELENA Hay - TOSHA  07/15/22 1706

## 2023-09-26 ENCOUNTER — HOSPITAL ENCOUNTER (EMERGENCY)
Age: 48
Discharge: HOME | End: 2023-09-26
Payer: MEDICAID

## 2023-09-26 DIAGNOSIS — J06.9 VIRAL URI: Primary | ICD-10-CM

## 2023-09-26 DIAGNOSIS — Z20.822 ENCOUNTER FOR LABORATORY TESTING FOR COVID-19 VIRUS: ICD-10-CM

## 2023-09-26 DIAGNOSIS — Z20.822 LAB TEST NEGATIVE FOR COVID-19 VIRUS: ICD-10-CM

## 2023-09-26 LAB
FLUAV AG SPEC QL: NEGATIVE
FLUBV AG SPEC QL: NEGATIVE
S PYO AG THROAT QL: NEGATIVE
SARS-COV-2 RDRP RESP QL NAA+PROBE: NOT  DETECTED

## 2023-09-26 PROCEDURE — 87635 SARS-COV-2 COVID-19 AMP PRB: CPT

## 2023-09-26 PROCEDURE — 99213 OFFICE O/P EST LOW 20 MIN: CPT

## 2023-09-26 PROCEDURE — 87804 INFLUENZA ASSAY W/OPTIC: CPT

## 2023-09-26 PROCEDURE — 87651 STREP A DNA AMP PROBE: CPT

## 2023-09-26 RX ORDER — BROMPHENIRAMINE MALEATE, PSEUDOEPHEDRINE HYDROCHLORIDE, AND DEXTROMETHORPHAN HYDROBROMIDE 2; 30; 10 MG/5ML; MG/5ML; MG/5ML
10 SYRUP ORAL 4 TIMES DAILY PRN
Qty: 118 ML | Refills: 0 | Status: SHIPPED | OUTPATIENT
Start: 2023-09-26

## 2023-09-26 ASSESSMENT — ENCOUNTER SYMPTOMS
COUGH: 1
SINUS PRESSURE: 0
NAUSEA: 0
SORE THROAT: 1
VOMITING: 0
DIARRHEA: 0
SHORTNESS OF BREATH: 0
ABDOMINAL PAIN: 0

## 2023-09-26 NOTE — ED PROVIDER NOTES
[address]       Chief Complaint   Patient presents with    Cough     Sinus congestion sore throat fever 103, back pain headache    Letter for School/Work       Nurses Notes reviewed and I agree except as noted in the HPI. HISTORY OF PRESENT ILLNESS   Rhea Mancia is a 51 y.o. female who presents to the urgent care for evaluation. The history is provided by the patient. URI  Presenting symptoms: congestion, cough, fever and sore throat    Presenting symptoms: no ear pain and no fatigue    Duration: started . Associated symptoms: myalgias    Associated symptoms: no headaches      COVID test at work was negative. The patient/patient representative has no other acute complaints at this time. REVIEW OF SYSTEMS     Review of Systems   Constitutional:  Positive for fever. Negative for chills and fatigue. HENT:  Positive for congestion and sore throat. Negative for ear pain and sinus pressure. Respiratory:  Positive for cough. Negative for shortness of breath. Cardiovascular:  Negative for chest pain. Gastrointestinal:  Negative for abdominal pain, diarrhea, nausea and vomiting. Musculoskeletal:  Positive for myalgias. Skin:  Negative for rash. Allergic/Immunologic: Negative for environmental allergies and food allergies. Neurological:  Negative for headaches. Hematological:  Negative for adenopathy. PAST MEDICAL HISTORY         Diagnosis Date    Anxiety     Depression     Moderate major depression ([address]) 2020    Opiate addiction ([address] [facility])        SURGICAL HISTORY     Patient  has a past surgical history that includes Tonsillectomy and  section.     CURRENT MEDICATIONS       Previous Medications    ARIPIPRAZOLE (ABILIFY) 10 MG TABLET    Take 1 tablet by mouth daily    ASPIRIN-ACETAMINOPHEN-CAFFEINE (EXCEDRIN MIGRAINE) 250-250-65 MG PER TABLET    Take 1 tablet by mouth every 6 hours as needed for

## 2023-09-26 NOTE — ED TRIAGE NOTES
To room 1 c/o cough congestion sore throat fever back pain.   Had a negative home covid test request covid flu and strep tests

## 2024-06-20 ENCOUNTER — SOCIAL WORK (OUTPATIENT)
Dept: INTERNAL MEDICINE CLINIC | Age: 49
End: 2024-06-20

## 2024-06-20 NOTE — PROGRESS NOTES
Patient contacted office to establish services, she was referred by a friend who is also a patient. Previous treatment with [facility], 12 mg daily suboxone tablets. Patient reports that her DPC: pain pills, ( tramadol) 5-10 plus pills daily. Reports she has been clean for fours years. Reports that she started taking pills 15-20 yrs ago. Patient reports that she lost her insurance. Patient works M-F 8A -5p.  Radha scheduled patient with Mary Muller CNP for 6/25 @ 8:20A

## 2024-06-25 ENCOUNTER — OFFICE VISIT (OUTPATIENT)
Dept: INTERNAL MEDICINE CLINIC | Age: 49
End: 2024-06-25
Payer: MEDICAID

## 2024-06-25 VITALS
BODY MASS INDEX: 26.63 KG/M2 | SYSTOLIC BLOOD PRESSURE: 132 MMHG | HEIGHT: 64 IN | RESPIRATION RATE: 16 BRPM | DIASTOLIC BLOOD PRESSURE: 80 MMHG | HEART RATE: 83 BPM | WEIGHT: 156 LBS

## 2024-06-25 DIAGNOSIS — F11.20 SEVERE OPIOID DEPENDENCE ON MAINTENANCE THERAPY (HCC): ICD-10-CM

## 2024-06-25 DIAGNOSIS — F11.20 SEVERE OPIOID USE DISORDER (HCC): Primary | ICD-10-CM

## 2024-06-25 LAB
ALCOHOL URINE: ABNORMAL
AMPHETAMINE SCREEN, URINE: ABNORMAL
BARBITURATE SCREEN, URINE: ABNORMAL
BENZODIAZEPINE SCREEN, URINE: ABNORMAL
BUPRENORPHINE URINE: ABNORMAL
COCAINE METABOLITE SCREEN URINE: ABNORMAL
FENTANYL SCREEN, URINE: ABNORMAL
GABAPENTIN SCREEN, URINE: ABNORMAL
MDMA URINE: ABNORMAL
METHADONE SCREEN, URINE: ABNORMAL
METHAMPHETAMINE, URINE: ABNORMAL
OPIATE SCREEN URINE: ABNORMAL
OXYCODONE SCREEN URINE: ABNORMAL
PHENCYCLIDINE SCREEN URINE: ABNORMAL
PROPOXYPHENE SCREEN, URINE: ABNORMAL
SYNTHETIC CANNABINOIDS(K2) SCREEN, URINE: ABNORMAL
THC SCREEN, URINE: ABNORMAL
TRAMADOL SCREEN URINE: ABNORMAL
TRICYCLIC ANTIDEPRESSANTS, UR: ABNORMAL

## 2024-06-25 PROCEDURE — 80305 DRUG TEST PRSMV DIR OPT OBS: CPT | Performed by: NURSE PRACTITIONER

## 2024-06-25 PROCEDURE — 99204 OFFICE O/P NEW MOD 45 MIN: CPT | Performed by: NURSE PRACTITIONER

## 2024-06-25 RX ORDER — BUPRENORPHINE HYDROCHLORIDE AND NALOXONE HYDROCHLORIDE DIHYDRATE 8; 2 MG/1; MG/1
1.5 TABLET SUBLINGUAL DAILY
Qty: 21 TABLET | Refills: 0 | Status: SHIPPED | OUTPATIENT
Start: 2024-06-25 | End: 2024-07-09

## 2024-06-25 SDOH — ECONOMIC STABILITY: FOOD INSECURITY: WITHIN THE PAST 12 MONTHS, YOU WORRIED THAT YOUR FOOD WOULD RUN OUT BEFORE YOU GOT MONEY TO BUY MORE.: NEVER TRUE

## 2024-06-25 SDOH — ECONOMIC STABILITY: HOUSING INSECURITY
IN THE LAST 12 MONTHS, WAS THERE A TIME WHEN YOU DID NOT HAVE A STEADY PLACE TO SLEEP OR SLEPT IN A SHELTER (INCLUDING NOW)?: NO

## 2024-06-25 SDOH — ECONOMIC STABILITY: FOOD INSECURITY: WITHIN THE PAST 12 MONTHS, THE FOOD YOU BOUGHT JUST DIDN'T LAST AND YOU DIDN'T HAVE MONEY TO GET MORE.: NEVER TRUE

## 2024-06-25 ASSESSMENT — PATIENT HEALTH QUESTIONNAIRE - PHQ9
2. FEELING DOWN, DEPRESSED OR HOPELESS: SEVERAL DAYS
4. FEELING TIRED OR HAVING LITTLE ENERGY: MORE THAN HALF THE DAYS
1. LITTLE INTEREST OR PLEASURE IN DOING THINGS: SEVERAL DAYS
SUM OF ALL RESPONSES TO PHQ9 QUESTIONS 1 & 2: 2
9. THOUGHTS THAT YOU WOULD BE BETTER OFF DEAD, OR OF HURTING YOURSELF: NOT AT ALL
7. TROUBLE CONCENTRATING ON THINGS, SUCH AS READING THE NEWSPAPER OR WATCHING TELEVISION: SEVERAL DAYS
SUM OF ALL RESPONSES TO PHQ QUESTIONS 1-9: 10
SUM OF ALL RESPONSES TO PHQ QUESTIONS 1-9: 10
3. TROUBLE FALLING OR STAYING ASLEEP: MORE THAN HALF THE DAYS
10. IF YOU CHECKED OFF ANY PROBLEMS, HOW DIFFICULT HAVE THESE PROBLEMS MADE IT FOR YOU TO DO YOUR WORK, TAKE CARE OF THINGS AT HOME, OR GET ALONG WITH OTHER PEOPLE: SOMEWHAT DIFFICULT
6. FEELING BAD ABOUT YOURSELF - OR THAT YOU ARE A FAILURE OR HAVE LET YOURSELF OR YOUR FAMILY DOWN: SEVERAL DAYS
SUM OF ALL RESPONSES TO PHQ QUESTIONS 1-9: 10
5. POOR APPETITE OR OVEREATING: MORE THAN HALF THE DAYS
SUM OF ALL RESPONSES TO PHQ QUESTIONS 1-9: 10
8. MOVING OR SPEAKING SO SLOWLY THAT OTHER PEOPLE COULD HAVE NOTICED. OR THE OPPOSITE, BEING SO FIGETY OR RESTLESS THAT YOU HAVE BEEN MOVING AROUND A LOT MORE THAN USUAL: NOT AT ALL

## 2024-06-25 ASSESSMENT — ENCOUNTER SYMPTOMS
SHORTNESS OF BREATH: 0
NAUSEA: 0
BLOOD IN STOOL: 0
ABDOMINAL PAIN: 0
DIARRHEA: 0
SINUS PRESSURE: 0
WHEEZING: 0
SINUS PAIN: 0
COUGH: 0
CONSTIPATION: 0
VOMITING: 0
PHOTOPHOBIA: 0
ABDOMINAL DISTENTION: 0
RHINORRHEA: 0
TROUBLE SWALLOWING: 0
SORE THROAT: 0

## 2024-06-25 NOTE — PROGRESS NOTES
Patient updated PHQ-9 depression screening.  Score:10  Patient identifies the following symptoms, having little interest/ pleasure in doing things, feeling down/ depressed,trouble falling asleep/ staying asleep, feeling tired/ having little energy, poor appetite/ overeating, trouble concentrating.  Provider updated.  Paper copy scanned into chart.

## 2024-06-25 NOTE — PROGRESS NOTES
[facility] PROFESSIONAL SERVS  [facility] INTERNAL MEDICINE AND MEDICATION MANAGEMENT  [address]  [address]  [place] [id number]  Dept: [phone]  Dept Fax: [phone]  Loc: [phone]     Visit Date:  2024    Patient:  Rhea Lazaro  YOB: 1975    HPI:     Chief Complaint   Patient presents with    New Patient     Rhea presents today for medical evaluation of severe opioid use disorder    I have not seen her previously    States that she had a tonsillectomy (only 1) around 29 years old due to infection. Once her pain pills ran out, she got some from her mother. Mother has always taken pain pills. Started buying tramadol online. Taking approximately 10 per day.      passed away- CJD when he was 49- degenerative brain disorder-  a month after diagnosis - about 5 years ago- she started taking adderral at this time as well.    4 years ago decided to stop- due to her children and the fact that she was having seizures when she was taking too many tramadol (no other history of seizure like activity). So she went to [facility], and has been maintained on suboxone 12 mg daily since. No relapses, no urges and cravings to use. She recently lost her insurance, and has been buying off the streets since that time    Urges and cravings controlled with Suboxone 12 mg     She lives and works in [place], will need late appointments    Urine positive for buprenorphine only    Never used intravenous    Completed counseling     Medications    Current Outpatient Medications:     buprenorphine-naloxone (SUBOXONE) 8-2 MG SUBL SL tablet, Place 1.5 tablets under the tongue daily for 14 days. Max Daily Amount: 1.5 tablets, Disp: 21 tablet, Rfl: 0    aspirin-acetaminophen-caffeine (EXCEDRIN MIGRAINE) 250-250-65 MG per tablet, Take 1 tablet by mouth every 6 hours as needed for Headaches, Disp: , Rfl:     ibuprofen (ADVIL;MOTRIN) 400 MG tablet, Take 2 tablets by mouth every

## 2024-07-01 ENCOUNTER — TELEMEDICINE (OUTPATIENT)
Dept: INTERNAL MEDICINE CLINIC | Age: 49
End: 2024-07-01
Payer: MEDICAID

## 2024-07-01 DIAGNOSIS — F11.20 SEVERE OPIOID USE DISORDER (HCC): ICD-10-CM

## 2024-07-01 PROCEDURE — 99441 PR PHYS/QHP TELEPHONE EVALUATION 5-10 MIN: CPT | Performed by: NURSE PRACTITIONER

## 2024-07-01 RX ORDER — BUPRENORPHINE HYDROCHLORIDE AND NALOXONE HYDROCHLORIDE DIHYDRATE 8; 2 MG/1; MG/1
1.5 TABLET SUBLINGUAL DAILY
Qty: 11 TABLET | Refills: 0 | Status: SHIPPED | OUTPATIENT
Start: 2024-07-01 | End: 2024-07-08

## 2024-07-01 NOTE — PROGRESS NOTES
Rhea Lazaro, was evaluated through a synchronous (real-time) audio-video encounter. The patient (or guardian if applicable) is aware that this is a billable service, which includes applicable co-pays. This Virtual Visit was conducted with patient's (and/or legal guardian's) consent. Patient identification was verified, and a caregiver was present when appropriate.   The patient was located at Home: [facility]  [place] [address]  Provider was located at Facility (Appt Dept): [address]  [address]  [place] [address]  Confirm you are appropriately licensed, registered, or certified to deliver care in the state where the patient is located as indicated above. If you are not or unsure, please re-schedule the visit: Yes, I confirm.     Rhea Lazaro (:  1975) is a Established patient, presenting virtually for evaluation of the following: severe opioid use disorder    She established with LIN Muller on 24- transferring from [facility] due to change in insurance coverage. Reportedly stable on suboxone for 5 years without any relapse.     Urges and cravings controlled on suboxone 12mg daily  She is not taking several of her medications due to cost - out of pocket expenses.   Taking prozac as needed- education provided and instructed this medication should be taken daily  She does follow with Dr Larios for primary care.     Denies any concerns related to mood or sleep. No additional needs voiced.     She is working full time day shift, off at 5pm. Requesting late appts.     Not currently active in counseling- completed while at [facility]       Assessment & Plan   Below is the assessment and plan developed based on review of pertinent history, physical exam, labs, studies, and medications.  1. Severe opioid use disorder (HCC)  -     buprenorphine-naloxone (SUBOXONE) 8-2 MG SUBL SL tablet; Place 1.5 tablets under the tongue daily for 7 days. Max Daily Amount: 1.5 tablets, Disp-11 tablet,

## 2024-07-15 ENCOUNTER — OFFICE VISIT (OUTPATIENT)
Dept: INTERNAL MEDICINE CLINIC | Age: 49
End: 2024-07-15
Payer: MEDICAID

## 2024-07-15 VITALS
HEART RATE: 92 BPM | SYSTOLIC BLOOD PRESSURE: 134 MMHG | RESPIRATION RATE: 16 BRPM | WEIGHT: 153 LBS | DIASTOLIC BLOOD PRESSURE: 65 MMHG | BODY MASS INDEX: 26.12 KG/M2

## 2024-07-15 DIAGNOSIS — F33.1 MODERATE EPISODE OF RECURRENT MAJOR DEPRESSIVE DISORDER (HCC): ICD-10-CM

## 2024-07-15 DIAGNOSIS — F11.20 SEVERE OPIOID USE DISORDER (HCC): Primary | ICD-10-CM

## 2024-07-15 LAB
ALCOHOL URINE: ABNORMAL
AMPHETAMINE SCREEN URINE: ABNORMAL
BARBITURATE SCREEN URINE: ABNORMAL
BENZODIAZEPINE SCREEN, URINE: ABNORMAL
BUPRENORPHINE URINE: ABNORMAL
COCAINE METABOLITE SCREEN URINE: ABNORMAL
FENTANYL SCREEN, URINE: ABNORMAL
GABAPENTIN SCREEN, URINE: ABNORMAL
MDMA URINE: ABNORMAL
METHADONE SCREEN, URINE: ABNORMAL
METHAMPHETAMINE, URINE: ABNORMAL
OPIATE SCREEN URINE: ABNORMAL
OXYCODONE SCREEN URINE: ABNORMAL
PHENCYCLIDINE SCREEN URINE: ABNORMAL
PROPOXYPHENE SCREEN, URINE: ABNORMAL
SYNTHETIC CANNABINOIDS(K2) SCREEN, URINE: ABNORMAL
THC SCREEN, URINE: ABNORMAL
TRAMADOL SCREEN URINE: ABNORMAL
TRICYCLIC ANTIDEPRESSANTS, UR: ABNORMAL

## 2024-07-15 PROCEDURE — G8428 CUR MEDS NOT DOCUMENT: HCPCS | Performed by: NURSE PRACTITIONER

## 2024-07-15 PROCEDURE — 99214 OFFICE O/P EST MOD 30 MIN: CPT | Performed by: NURSE PRACTITIONER

## 2024-07-15 PROCEDURE — G8419 CALC BMI OUT NRM PARAM NOF/U: HCPCS | Performed by: NURSE PRACTITIONER

## 2024-07-15 PROCEDURE — 80305 DRUG TEST PRSMV DIR OPT OBS: CPT | Performed by: NURSE PRACTITIONER

## 2024-07-15 PROCEDURE — 4004F PT TOBACCO SCREEN RCVD TLK: CPT | Performed by: NURSE PRACTITIONER

## 2024-07-15 RX ORDER — BUPRENORPHINE HYDROCHLORIDE AND NALOXONE HYDROCHLORIDE DIHYDRATE 8; 2 MG/1; MG/1
1.5 TABLET SUBLINGUAL DAILY
Qty: 32 TABLET | Refills: 0 | Status: SHIPPED | OUTPATIENT
Start: 2024-07-15 | End: 2024-08-05

## 2024-07-15 NOTE — PROGRESS NOTES
07/15/24   The patients primary care physician is Luan Larios DO Stormy L Tejas is a 48 y.o.  female who presents in office today for follow up medication assisted treatment, substance use disorder.   She established with LIN Muller on 6/25/24- transferring from [facility] due to change in insurance coverage. Reportedly stable on suboxone for 5 years without any relapse.     Pt denies any urges, triggers, or cravings.     UDS acceptable    Not currently active in counseling- completed previously    Follows with Dr Larios for pcp    Agrees to routine labs. Declines HIV or hepatitis screening    F/u in 21 days, then monthly- will agree to alternate tele visit with in office due to length of sobriety and scheduling conflicts      Pertinent Drug History  Started buying tramadol online at the age of 29 after a tonsillectomy  Taking approx 10 tablets daily  5 years ago her  passed away and she started taking adderral    Past Medical History:   Diagnosis Date    Anxiety     Depression     Moderate major depression (HCC) 09/03/2020    Opiate addiction (HCC)          Social History     Socioeconomic History    Marital status:      Spouse name: Not on file    Number of children: Not on file    Years of education: Not on file    Highest education level: Not on file   Occupational History    Not on file   Tobacco Use    Smoking status: Every Day     Current packs/day: 0.50     Average packs/day: 0.5 packs/day for 10.0 years (5.0 ttl pk-yrs)     Types: Cigarettes    Smokeless tobacco: Never   Vaping Use    Vaping Use: Never used   Substance and Sexual Activity    Alcohol use: No    Drug use: Yes     Types: Suboxone     Comment: history of\"pain pill addiction\" last used 2020    Sexual activity: Not on file   Other Topics Concern    Not on file   Social History Narrative    Not on file     Social Determinants of Health     Financial Resource Strain: Low Risk  (6/25/2024)    Overall Financial Resource

## 2024-07-16 ASSESSMENT — ENCOUNTER SYMPTOMS
CHEST TIGHTNESS: 0
DIARRHEA: 0
COUGH: 0
WHEEZING: 0
NAUSEA: 0
VOMITING: 0
SHORTNESS OF BREATH: 0
CONSTIPATION: 0
ABDOMINAL PAIN: 0

## 2024-08-05 DIAGNOSIS — F11.20 SEVERE OPIOID USE DISORDER (HCC): ICD-10-CM

## 2024-08-05 RX ORDER — BUPRENORPHINE HYDROCHLORIDE AND NALOXONE HYDROCHLORIDE DIHYDRATE 8; 2 MG/1; MG/1
1.5 TABLET SUBLINGUAL DAILY
Qty: 15 TABLET | Refills: 0 | Status: SHIPPED | OUTPATIENT
Start: 2024-08-05 | End: 2024-08-15

## 2024-08-15 ENCOUNTER — OFFICE VISIT (OUTPATIENT)
Dept: INTERNAL MEDICINE CLINIC | Age: 49
End: 2024-08-15
Payer: MEDICAID

## 2024-08-15 VITALS
WEIGHT: 151 LBS | HEART RATE: 86 BPM | SYSTOLIC BLOOD PRESSURE: 132 MMHG | BODY MASS INDEX: 25.78 KG/M2 | DIASTOLIC BLOOD PRESSURE: 80 MMHG | RESPIRATION RATE: 16 BRPM

## 2024-08-15 DIAGNOSIS — F11.20 SEVERE OPIOID USE DISORDER (HCC): Primary | ICD-10-CM

## 2024-08-15 LAB
ALCOHOL URINE: ABNORMAL
AMPHETAMINE SCREEN URINE: ABNORMAL
BARBITURATE SCREEN URINE: ABNORMAL
BENZODIAZEPINE SCREEN, URINE: ABNORMAL
BUPRENORPHINE URINE: ABNORMAL
COCAINE METABOLITE SCREEN URINE: ABNORMAL
FENTANYL SCREEN, URINE: ABNORMAL
GABAPENTIN SCREEN, URINE: ABNORMAL
MDMA, URINE: ABNORMAL
METHADONE SCREEN, URINE: ABNORMAL
METHAMPHETAMINE, URINE: ABNORMAL
OPIATE SCREEN URINE: ABNORMAL
OXYCODONE SCREEN URINE: ABNORMAL
PHENCYCLIDINE SCREEN URINE: ABNORMAL
PROPOXYPHENE SCREEN, URINE: ABNORMAL
SYNTHETIC CANNABINOIDS(K2) SCREEN, URINE: ABNORMAL
THC SCREEN, URINE: ABNORMAL
TRAMADOL SCREEN URINE: ABNORMAL
TRICYCLIC ANTIDEPRESSANTS, UR: ABNORMAL

## 2024-08-15 PROCEDURE — G8419 CALC BMI OUT NRM PARAM NOF/U: HCPCS | Performed by: NURSE PRACTITIONER

## 2024-08-15 PROCEDURE — 4004F PT TOBACCO SCREEN RCVD TLK: CPT | Performed by: NURSE PRACTITIONER

## 2024-08-15 PROCEDURE — 99214 OFFICE O/P EST MOD 30 MIN: CPT | Performed by: NURSE PRACTITIONER

## 2024-08-15 PROCEDURE — 80305 DRUG TEST PRSMV DIR OPT OBS: CPT | Performed by: NURSE PRACTITIONER

## 2024-08-15 PROCEDURE — G8427 DOCREV CUR MEDS BY ELIG CLIN: HCPCS | Performed by: NURSE PRACTITIONER

## 2024-08-15 RX ORDER — BUPRENORPHINE HYDROCHLORIDE AND NALOXONE HYDROCHLORIDE DIHYDRATE 8; 2 MG/1; MG/1
1.5 TABLET SUBLINGUAL DAILY
Qty: 39 TABLET | Refills: 0 | Status: SHIPPED | OUTPATIENT
Start: 2024-08-15 | End: 2024-09-10

## 2024-08-15 RX ORDER — ARIPIPRAZOLE 10 MG/1
10 TABLET ORAL DAILY
Qty: 30 TABLET | Refills: 0 | Status: SHIPPED | OUTPATIENT
Start: 2024-08-15 | End: 2024-09-14

## 2024-08-15 RX ORDER — PRAZOSIN HYDROCHLORIDE 2 MG/1
2 CAPSULE ORAL NIGHTLY
Qty: 30 CAPSULE | Refills: 0 | Status: SHIPPED | OUTPATIENT
Start: 2024-08-15 | End: 2024-09-14

## 2024-08-15 RX ORDER — ARIPIPRAZOLE 10 MG/1
10 TABLET ORAL DAILY
COMMUNITY
End: 2024-08-15 | Stop reason: SDUPTHER

## 2024-08-15 NOTE — PROGRESS NOTES
08/19/24   The patients primary care physician is Luan Larios DO Stormy L Tejas is a 48 y.o.  female who presents in office today for follow up medication assisted treatment, substance use disorder.   She established with LIN Muller on 6/25/24- transferring from [facility] due to change in insurance coverage. Reportedly stable on suboxone for 5 years without any relapse.      Pt denies any urges, triggers, or cravings.     UDS acceptable    States she had labs drawn through Dr Reynolds office after last visit- agrees to sign consent     Pt states she has hx PTSD and use to be on praxosin for the nightmares- Reports the nightmares are starting to occur again, request refill      Pertinent Drug History  Started buying tramadol online at the age of 29 after a tonsillectomy  Taking approx 10 tablets daily  5 years ago her  passed away and she started taking adderral  Past Medical History:   Diagnosis Date    Anxiety     Depression     Moderate major depression (HCC) 09/03/2020    Opiate addiction (HCC)          Social History     Socioeconomic History    Marital status:      Spouse name: Not on file    Number of children: Not on file    Years of education: Not on file    Highest education level: Not on file   Occupational History    Not on file   Tobacco Use    Smoking status: Every Day     Current packs/day: 0.50     Average packs/day: 0.5 packs/day for 10.0 years (5.0 ttl pk-yrs)     Types: Cigarettes    Smokeless tobacco: Never   Vaping Use    Vaping status: Never Used   Substance and Sexual Activity    Alcohol use: No    Drug use: Yes     Types: Suboxone     Comment: history of\"pain pill addiction\" last used 2020    Sexual activity: Not on file   Other Topics Concern    Not on file   Social History Narrative    Not on file     Social Determinants of Health     Financial Resource Strain: Low Risk  (6/25/2024)    Overall Financial Resource Strain (CARDIA)     Difficulty of Paying Living

## 2024-08-16 ENCOUNTER — TELEPHONE (OUTPATIENT)
Dept: INTERNAL MEDICINE CLINIC | Age: 49
End: 2024-08-16

## 2024-08-16 NOTE — TELEPHONE ENCOUNTER
Called Dr Larios office per KB to get pt recent labs. They are faxing labs from 07/2024 to [phone]

## 2024-09-12 ENCOUNTER — TELEPHONE (OUTPATIENT)
Dept: INTERNAL MEDICINE CLINIC | Age: 49
End: 2024-09-12

## 2024-09-12 DIAGNOSIS — F11.20 SEVERE OPIOID USE DISORDER (HCC): Primary | ICD-10-CM

## 2024-09-12 RX ORDER — BUPRENORPHINE HYDROCHLORIDE AND NALOXONE HYDROCHLORIDE DIHYDRATE 8; 2 MG/1; MG/1
1.5 TABLET SUBLINGUAL DAILY
Qty: 6 TABLET | Refills: 0 | Status: CANCELLED | OUTPATIENT
Start: 2024-09-12 | End: 2024-09-16

## 2024-09-12 RX ORDER — BUPRENORPHINE HYDROCHLORIDE AND NALOXONE HYDROCHLORIDE DIHYDRATE 8; 2 MG/1; MG/1
1.5 TABLET SUBLINGUAL DAILY
Qty: 8 TABLET | Refills: 0 | Status: SHIPPED | OUTPATIENT
Start: 2024-09-12 | End: 2024-09-17

## 2024-09-16 ENCOUNTER — OFFICE VISIT (OUTPATIENT)
Dept: INTERNAL MEDICINE CLINIC | Age: 49
End: 2024-09-16
Payer: MEDICAID

## 2024-09-16 VITALS
RESPIRATION RATE: 16 BRPM | DIASTOLIC BLOOD PRESSURE: 86 MMHG | SYSTOLIC BLOOD PRESSURE: 137 MMHG | WEIGHT: 159 LBS | BODY MASS INDEX: 27.15 KG/M2 | HEART RATE: 92 BPM

## 2024-09-16 DIAGNOSIS — F11.20 SEVERE OPIOID USE DISORDER (HCC): Primary | ICD-10-CM

## 2024-09-16 PROCEDURE — 3017F COLORECTAL CA SCREEN DOC REV: CPT | Performed by: NURSE PRACTITIONER

## 2024-09-16 PROCEDURE — 4004F PT TOBACCO SCREEN RCVD TLK: CPT | Performed by: NURSE PRACTITIONER

## 2024-09-16 PROCEDURE — 99214 OFFICE O/P EST MOD 30 MIN: CPT | Performed by: NURSE PRACTITIONER

## 2024-09-16 PROCEDURE — 80305 DRUG TEST PRSMV DIR OPT OBS: CPT | Performed by: NURSE PRACTITIONER

## 2024-09-16 PROCEDURE — G8419 CALC BMI OUT NRM PARAM NOF/U: HCPCS | Performed by: NURSE PRACTITIONER

## 2024-09-16 PROCEDURE — G8427 DOCREV CUR MEDS BY ELIG CLIN: HCPCS | Performed by: NURSE PRACTITIONER

## 2024-09-16 RX ORDER — PRAZOSIN HYDROCHLORIDE 5 MG/1
5 CAPSULE ORAL NIGHTLY
Qty: 30 CAPSULE | Refills: 3 | Status: SHIPPED | OUTPATIENT
Start: 2024-09-16

## 2024-09-16 RX ORDER — ARIPIPRAZOLE 10 MG/1
10 TABLET ORAL DAILY
Qty: 30 TABLET | Refills: 0 | Status: SHIPPED | OUTPATIENT
Start: 2024-09-16 | End: 2024-10-16

## 2024-09-16 RX ORDER — PRAZOSIN HYDROCHLORIDE 2 MG/1
2 CAPSULE ORAL NIGHTLY
Qty: 30 CAPSULE | Refills: 0 | Status: SHIPPED | OUTPATIENT
Start: 2024-09-16 | End: 2024-09-16 | Stop reason: ALTCHOICE

## 2024-09-16 RX ORDER — BUPRENORPHINE HYDROCHLORIDE AND NALOXONE HYDROCHLORIDE DIHYDRATE 8; 2 MG/1; MG/1
1.5 TABLET SUBLINGUAL DAILY
Qty: 42 TABLET | Refills: 0 | Status: SHIPPED | OUTPATIENT
Start: 2024-09-16 | End: 2024-10-14

## 2024-10-14 ENCOUNTER — OFFICE VISIT (OUTPATIENT)
Dept: INTERNAL MEDICINE CLINIC | Age: 49
End: 2024-10-14
Payer: MEDICAID

## 2024-10-14 VITALS
RESPIRATION RATE: 16 BRPM | SYSTOLIC BLOOD PRESSURE: 133 MMHG | DIASTOLIC BLOOD PRESSURE: 64 MMHG | HEART RATE: 79 BPM | WEIGHT: 166 LBS | BODY MASS INDEX: 28.34 KG/M2

## 2024-10-14 DIAGNOSIS — F11.20 SEVERE OPIOID USE DISORDER (HCC): Primary | ICD-10-CM

## 2024-10-14 PROCEDURE — G8484 FLU IMMUNIZE NO ADMIN: HCPCS | Performed by: NURSE PRACTITIONER

## 2024-10-14 PROCEDURE — 4004F PT TOBACCO SCREEN RCVD TLK: CPT | Performed by: NURSE PRACTITIONER

## 2024-10-14 PROCEDURE — 3017F COLORECTAL CA SCREEN DOC REV: CPT | Performed by: NURSE PRACTITIONER

## 2024-10-14 PROCEDURE — G8419 CALC BMI OUT NRM PARAM NOF/U: HCPCS | Performed by: NURSE PRACTITIONER

## 2024-10-14 PROCEDURE — 99213 OFFICE O/P EST LOW 20 MIN: CPT | Performed by: NURSE PRACTITIONER

## 2024-10-14 PROCEDURE — 80305 DRUG TEST PRSMV DIR OPT OBS: CPT | Performed by: NURSE PRACTITIONER

## 2024-10-14 PROCEDURE — G8427 DOCREV CUR MEDS BY ELIG CLIN: HCPCS | Performed by: NURSE PRACTITIONER

## 2024-10-14 RX ORDER — ARIPIPRAZOLE 10 MG/1
10 TABLET ORAL DAILY
Qty: 30 TABLET | Refills: 0 | Status: SHIPPED | OUTPATIENT
Start: 2024-10-14 | End: 2024-11-13

## 2024-10-14 RX ORDER — BUPRENORPHINE HYDROCHLORIDE AND NALOXONE HYDROCHLORIDE DIHYDRATE 8; 2 MG/1; MG/1
1.5 TABLET SUBLINGUAL DAILY
Qty: 42 TABLET | Refills: 0 | Status: SHIPPED | OUTPATIENT
Start: 2024-10-14 | End: 2024-11-11

## 2024-10-14 NOTE — PROGRESS NOTES
Verbal order per JEFFREY HOUSER CNP for urine drug screen. Positive for BUP. Verified results with SERGIO ADAMS RN.

## 2024-10-14 NOTE — PROGRESS NOTES
10/14/24   The patients primary care physician is Luan Larios DO Stormy L Tejas is a 49 y.o.  female who presents in office today for follow up medication assisted treatment, substance use disorder.   She established with LIN Muller on 6/25/24- transferring from [facility] due to change in insurance coverage. Reportedly stable on suboxone for 5 years without any relapse.     Pt denies any urges, triggers, or cravings.     Nightmares have improved since prazosin increased. Feels mood is stable. No additional needs voiced.     UDS acceptable    Pertinent Drug History  Started buying tramadol online at the age of 29 after a tonsillectomy  Taking approx 10 tablets daily  5 years ago her  passed away and she started taking adderral       Past Medical History:   Diagnosis Date    Anxiety     Depression     Moderate major depression (HCC) 09/03/2020    Opiate addiction (HCC)          Social History     Socioeconomic History    Marital status:      Spouse name: Not on file    Number of children: Not on file    Years of education: Not on file    Highest education level: Not on file   Occupational History    Not on file   Tobacco Use    Smoking status: Every Day     Current packs/day: 0.50     Average packs/day: 0.5 packs/day for 10.0 years (5.0 ttl pk-yrs)     Types: Cigarettes    Smokeless tobacco: Never   Vaping Use    Vaping status: Never Used   Substance and Sexual Activity    Alcohol use: No    Drug use: Yes     Types: Suboxone     Comment: history of\"pain pill addiction\" last used 2020    Sexual activity: Not on file   Other Topics Concern    Not on file   Social History Narrative    Not on file     Social Determinants of Health     Financial Resource Strain: Low Risk  (6/25/2024)    Overall Financial Resource Strain (CARDIA)     Difficulty of Paying Living Expenses: Not hard at all   Food Insecurity: No Food Insecurity (6/25/2024)    Hunger Vital Sign     Worried About Running Out of Food in

## 2024-11-11 ENCOUNTER — NURSE ONLY (OUTPATIENT)
Dept: INTERNAL MEDICINE CLINIC | Age: 49
End: 2024-11-11

## 2024-11-11 VITALS
WEIGHT: 168 LBS | SYSTOLIC BLOOD PRESSURE: 131 MMHG | RESPIRATION RATE: 16 BRPM | HEART RATE: 86 BPM | DIASTOLIC BLOOD PRESSURE: 79 MMHG | BODY MASS INDEX: 28.68 KG/M2

## 2024-11-11 DIAGNOSIS — F11.20 SEVERE OPIOID USE DISORDER (HCC): Primary | ICD-10-CM

## 2024-11-11 PROCEDURE — 80305 DRUG TEST PRSMV DIR OPT OBS: CPT | Performed by: NURSE PRACTITIONER

## 2024-11-11 RX ORDER — ARIPIPRAZOLE 10 MG/1
10 TABLET ORAL DAILY
Qty: 30 TABLET | Refills: 0 | Status: SHIPPED | OUTPATIENT
Start: 2024-11-11 | End: 2024-12-11

## 2024-11-11 RX ORDER — BUPRENORPHINE HYDROCHLORIDE AND NALOXONE HYDROCHLORIDE DIHYDRATE 8; 2 MG/1; MG/1
1.5 TABLET SUBLINGUAL DAILY
Qty: 44 TABLET | Refills: 0 | OUTPATIENT
Start: 2024-11-11 | End: 2024-12-10

## 2024-11-11 NOTE — PROGRESS NOTES
Verbal order per JEFFREY HOUSER CNP for urine drug screen. Positive for BUP.   Patient here for a nurse visit. Medication called into [facility] pharmacy.

## 2024-12-10 ENCOUNTER — OFFICE VISIT (OUTPATIENT)
Dept: INTERNAL MEDICINE CLINIC | Age: 49
End: 2024-12-10

## 2024-12-10 VITALS
WEIGHT: 168 LBS | BODY MASS INDEX: 28.68 KG/M2 | DIASTOLIC BLOOD PRESSURE: 70 MMHG | SYSTOLIC BLOOD PRESSURE: 142 MMHG | RESPIRATION RATE: 16 BRPM | HEART RATE: 81 BPM

## 2024-12-10 DIAGNOSIS — F33.1 MODERATE EPISODE OF RECURRENT MAJOR DEPRESSIVE DISORDER (HCC): ICD-10-CM

## 2024-12-10 DIAGNOSIS — F11.20 SEVERE OPIOID USE DISORDER (HCC): Primary | ICD-10-CM

## 2024-12-10 RX ORDER — ARIPIPRAZOLE 10 MG/1
10 TABLET ORAL DAILY
Qty: 30 TABLET | Refills: 0 | Status: SHIPPED | OUTPATIENT
Start: 2024-12-10 | End: 2025-01-09

## 2024-12-10 RX ORDER — BUPRENORPHINE HYDROCHLORIDE AND NALOXONE HYDROCHLORIDE DIHYDRATE 8; 2 MG/1; MG/1
1.5 TABLET SUBLINGUAL DAILY
Qty: 42 TABLET | Refills: 0 | Status: SHIPPED | OUTPATIENT
Start: 2024-12-10 | End: 2025-01-07

## 2024-12-10 RX ORDER — PRAZOSIN HYDROCHLORIDE 5 MG/1
5 CAPSULE ORAL NIGHTLY
Qty: 30 CAPSULE | Refills: 3 | Status: SHIPPED | OUTPATIENT
Start: 2024-12-10

## 2024-12-10 NOTE — PROGRESS NOTES
12/10/24   The patients primary care physician is Luan Larios DO Stormy L Tejas is a 49 y.o.  female who presents in office today for follow up medication assisted treatment, substance use disorder.   She established with LIN Muller on 6/25/24- transferring from [facility] due to change in insurance coverage. Reportedly stable on suboxone for 5 years without any relapse.     Pt denies any urges, triggers, or cravings.     UDS acceptable    No concerns related to mood or sleep. Feels stable on current medication regimen. No additional needs voiced      Pertinent Drug History  Started buying tramadol online at the age of 29 after a tonsillectomy  Taking approx 10 tablets daily  5 years ago her  passed away and she started taking adderral        Past Medical History:   Diagnosis Date    Anxiety     Depression     Moderate major depression (HCC) 09/03/2020    Opiate addiction (HCC)          Social History     Socioeconomic History    Marital status:      Spouse name: Not on file    Number of children: Not on file    Years of education: Not on file    Highest education level: Not on file   Occupational History    Not on file   Tobacco Use    Smoking status: Every Day     Current packs/day: 0.50     Average packs/day: 0.5 packs/day for 10.0 years (5.0 ttl pk-yrs)     Types: Cigarettes    Smokeless tobacco: Never   Vaping Use    Vaping status: Never Used   Substance and Sexual Activity    Alcohol use: No    Drug use: Yes     Types: Suboxone     Comment: history of\"pain pill addiction\" last used 2020    Sexual activity: Not on file   Other Topics Concern    Not on file   Social History Narrative    Not on file     Social Determinants of Health     Financial Resource Strain: Low Risk  (6/25/2024)    Overall Financial Resource Strain (CARDIA)     Difficulty of Paying Living Expenses: Not hard at all   Food Insecurity: No Food Insecurity (6/25/2024)    Hunger Vital Sign     Worried About Running Out

## 2025-01-07 ENCOUNTER — OFFICE VISIT (OUTPATIENT)
Dept: INTERNAL MEDICINE CLINIC | Age: 50
End: 2025-01-07

## 2025-01-07 VITALS
DIASTOLIC BLOOD PRESSURE: 74 MMHG | BODY MASS INDEX: 29.36 KG/M2 | RESPIRATION RATE: 16 BRPM | HEART RATE: 77 BPM | SYSTOLIC BLOOD PRESSURE: 126 MMHG | WEIGHT: 172 LBS

## 2025-01-07 DIAGNOSIS — F11.20 SEVERE OPIOID USE DISORDER (HCC): Primary | ICD-10-CM

## 2025-01-07 PROCEDURE — 80305 DRUG TEST PRSMV DIR OPT OBS: CPT | Performed by: NURSE PRACTITIONER

## 2025-01-07 PROCEDURE — 99214 OFFICE O/P EST MOD 30 MIN: CPT | Performed by: NURSE PRACTITIONER

## 2025-01-07 RX ORDER — BUPRENORPHINE HYDROCHLORIDE AND NALOXONE HYDROCHLORIDE DIHYDRATE 8; 2 MG/1; MG/1
1.5 TABLET SUBLINGUAL DAILY
Qty: 42 TABLET | Refills: 0 | Status: SHIPPED | OUTPATIENT
Start: 2025-01-07 | End: 2025-02-04

## 2025-01-07 RX ORDER — ARIPIPRAZOLE 10 MG/1
10 TABLET ORAL DAILY
Qty: 30 TABLET | Refills: 0 | Status: SHIPPED | OUTPATIENT
Start: 2025-01-07 | End: 2025-02-06

## 2025-01-07 ASSESSMENT — PATIENT HEALTH QUESTIONNAIRE - PHQ9
8. MOVING OR SPEAKING SO SLOWLY THAT OTHER PEOPLE COULD HAVE NOTICED. OR THE OPPOSITE, BEING SO FIGETY OR RESTLESS THAT YOU HAVE BEEN MOVING AROUND A LOT MORE THAN USUAL: NOT AT ALL
7. TROUBLE CONCENTRATING ON THINGS, SUCH AS READING THE NEWSPAPER OR WATCHING TELEVISION: NOT AT ALL
1. LITTLE INTEREST OR PLEASURE IN DOING THINGS: NOT AT ALL
SUM OF ALL RESPONSES TO PHQ QUESTIONS 1-9: 1
9. THOUGHTS THAT YOU WOULD BE BETTER OFF DEAD, OR OF HURTING YOURSELF: NOT AT ALL
10. IF YOU CHECKED OFF ANY PROBLEMS, HOW DIFFICULT HAVE THESE PROBLEMS MADE IT FOR YOU TO DO YOUR WORK, TAKE CARE OF THINGS AT HOME, OR GET ALONG WITH OTHER PEOPLE: NOT DIFFICULT AT ALL
2. FEELING DOWN, DEPRESSED OR HOPELESS: NOT AT ALL
SUM OF ALL RESPONSES TO PHQ QUESTIONS 1-9: 1
3. TROUBLE FALLING OR STAYING ASLEEP: SEVERAL DAYS
SUM OF ALL RESPONSES TO PHQ QUESTIONS 1-9: 1
5. POOR APPETITE OR OVEREATING: NOT AT ALL
6. FEELING BAD ABOUT YOURSELF - OR THAT YOU ARE A FAILURE OR HAVE LET YOURSELF OR YOUR FAMILY DOWN: NOT AT ALL
4. FEELING TIRED OR HAVING LITTLE ENERGY: NOT AT ALL
SUM OF ALL RESPONSES TO PHQ9 QUESTIONS 1 & 2: 0
SUM OF ALL RESPONSES TO PHQ QUESTIONS 1-9: 1

## 2025-02-04 ENCOUNTER — OFFICE VISIT (OUTPATIENT)
Dept: INTERNAL MEDICINE CLINIC | Age: 50
End: 2025-02-04

## 2025-02-04 VITALS
DIASTOLIC BLOOD PRESSURE: 72 MMHG | RESPIRATION RATE: 16 BRPM | HEART RATE: 76 BPM | WEIGHT: 176 LBS | SYSTOLIC BLOOD PRESSURE: 136 MMHG | BODY MASS INDEX: 30.05 KG/M2

## 2025-02-04 DIAGNOSIS — F11.20 SEVERE OPIOID USE DISORDER (HCC): Primary | ICD-10-CM

## 2025-02-04 DIAGNOSIS — R61 NIGHT SWEATS: ICD-10-CM

## 2025-02-04 RX ORDER — BUPRENORPHINE HYDROCHLORIDE AND NALOXONE HYDROCHLORIDE DIHYDRATE 8; 2 MG/1; MG/1
1.5 TABLET SUBLINGUAL DAILY
Qty: 42 TABLET | Refills: 0 | Status: SHIPPED | OUTPATIENT
Start: 2025-02-04 | End: 2025-03-04

## 2025-02-04 RX ORDER — ARIPIPRAZOLE 10 MG/1
10 TABLET ORAL DAILY
Qty: 30 TABLET | Refills: 0 | Status: SHIPPED | OUTPATIENT
Start: 2025-02-04 | End: 2025-03-06

## 2025-02-04 NOTE — PROGRESS NOTES
02/04/25   The patients primary care physician is Luan Larios DO Stormy L Tejas is a 49 y.o.  female who presents in office today for follow up medication assisted treatment, substance use disorder.   Transferred care from [facility] 6/25/24  Reportedly stable on suboxone > 5 years    Pt denies any urges, triggers, or cravings.     UDS acceptable    Pt reports having frequent hot flashes, night sweats, and weight gain. No additional symptoms with skin, hair, mood or changes in menstruations. Pt feels she is starting menopause. Education provided.  Treatment options discussed. Pt plans to try primrose OTC  Labs needed, including TSH- pt instructed to complete  - will continue to monitor    Pertinent Drug History  Started buying tramadol online at the age of 29 after a tonsillectomy  Taking approx 10 tablets daily  5 years ago her  passed away and she started taking adderral  She then seeked tx and has been stable on suboxone ever since.        Past Medical History:   Diagnosis Date    Anxiety     Depression     Moderate major depression (HCC) 09/03/2020    Opiate addiction (HCC)          Social History     Socioeconomic History    Marital status:      Spouse name: Not on file    Number of children: Not on file    Years of education: Not on file    Highest education level: Not on file   Occupational History    Not on file   Tobacco Use    Smoking status: Every Day     Current packs/day: 0.50     Average packs/day: 0.5 packs/day for 10.0 years (5.0 ttl pk-yrs)     Types: Cigarettes    Smokeless tobacco: Never   Vaping Use    Vaping status: Never Used   Substance and Sexual Activity    Alcohol use: No    Drug use: Yes     Types: Suboxone     Comment: history of\"pain pill addiction\" last used 2020    Sexual activity: Not on file   Other Topics Concern    Not on file   Social History Narrative    Not on file     Social Determinants of Health     Financial Resource Strain: Low Risk  (6/25/2024)     Nursing Nursing/Physical therapy

## 2025-03-04 ENCOUNTER — NURSE ONLY (OUTPATIENT)
Dept: INTERNAL MEDICINE CLINIC | Age: 50
End: 2025-03-04

## 2025-03-04 DIAGNOSIS — F11.20 SEVERE OPIOID USE DISORDER (HCC): Primary | ICD-10-CM

## 2025-03-04 PROCEDURE — 80305 DRUG TEST PRSMV DIR OPT OBS: CPT | Performed by: NURSE PRACTITIONER

## 2025-03-04 RX ORDER — CLONIDINE HYDROCHLORIDE 0.1 MG/1
0.1 TABLET ORAL 3 TIMES DAILY PRN
Qty: 84 TABLET | Refills: 0 | Status: SHIPPED | OUTPATIENT
Start: 2025-03-04 | End: 2025-04-01

## 2025-03-04 RX ORDER — BUPRENORPHINE HYDROCHLORIDE AND NALOXONE HYDROCHLORIDE DIHYDRATE 8; 2 MG/1; MG/1
1.5 TABLET SUBLINGUAL DAILY
Qty: 42 TABLET | Refills: 0 | Status: SHIPPED | OUTPATIENT
Start: 2025-03-04 | End: 2025-04-01

## 2025-03-04 RX ORDER — ARIPIPRAZOLE 10 MG/1
10 TABLET ORAL DAILY
Qty: 30 TABLET | Refills: 0 | Status: SHIPPED | OUTPATIENT
Start: 2025-03-04 | End: 2025-04-03

## 2025-04-01 ENCOUNTER — OFFICE VISIT (OUTPATIENT)
Dept: INTERNAL MEDICINE CLINIC | Age: 50
End: 2025-04-01

## 2025-04-01 VITALS
BODY MASS INDEX: 31.07 KG/M2 | RESPIRATION RATE: 16 BRPM | WEIGHT: 182 LBS | DIASTOLIC BLOOD PRESSURE: 60 MMHG | SYSTOLIC BLOOD PRESSURE: 130 MMHG | HEART RATE: 79 BPM

## 2025-04-01 DIAGNOSIS — F11.20 SEVERE OPIOID USE DISORDER: Primary | ICD-10-CM

## 2025-04-01 DIAGNOSIS — N95.1 MENOPAUSAL SYNDROME (HOT FLASHES): ICD-10-CM

## 2025-04-01 PROCEDURE — 99214 OFFICE O/P EST MOD 30 MIN: CPT | Performed by: NURSE PRACTITIONER

## 2025-04-01 PROCEDURE — 80305 DRUG TEST PRSMV DIR OPT OBS: CPT | Performed by: NURSE PRACTITIONER

## 2025-04-01 RX ORDER — BUPRENORPHINE HYDROCHLORIDE AND NALOXONE HYDROCHLORIDE DIHYDRATE 8; 2 MG/1; MG/1
1.5 TABLET SUBLINGUAL DAILY
Qty: 42 TABLET | Refills: 0 | Status: SHIPPED | OUTPATIENT
Start: 2025-04-01 | End: 2025-04-29

## 2025-04-01 RX ORDER — VENLAFAXINE HYDROCHLORIDE 37.5 MG/1
37.5 CAPSULE, EXTENDED RELEASE ORAL DAILY
Qty: 30 CAPSULE | Refills: 3 | Status: SHIPPED | OUTPATIENT
Start: 2025-04-01

## 2025-04-01 NOTE — PROGRESS NOTES
04/07/25   The patients primary care physician is Luan Larios DO Stormy L Tejas is a 49 y.o.  female who presents in office today for follow up medication assisted treatment, substance use disorder.   Transferred care from [facility] 6/25/24  Reportedly stable on suboxone > 5 years    Pt denies any urges, triggers, or cravings.     UDS acceptable    Continues to report having hot flashes, mood swings, and weight gain despite lifestyle modifications.   States she hasn't changes any eating habits.   Cont need for labs  Will start low does effexor to help alleviate sx likely related to menopause    Pertinent Drug History  Started buying tramadol online at the age of 29 after a tonsillectomy  Taking approx 10 tablets daily  5 years ago her  passed away and she started taking adderral  She then seeked tx and has been stable on suboxone ever since.         Past Medical History:   Diagnosis Date    Anxiety     Depression     Moderate major depression (HCC) 09/03/2020    Opiate addiction (HCC)          Social History     Socioeconomic History    Marital status:      Spouse name: Not on file    Number of children: Not on file    Years of education: Not on file    Highest education level: Not on file   Occupational History    Not on file   Tobacco Use    Smoking status: Every Day     Current packs/day: 0.50     Average packs/day: 0.5 packs/day for 10.0 years (5.0 ttl pk-yrs)     Types: Cigarettes    Smokeless tobacco: Never   Vaping Use    Vaping status: Never Used   Substance and Sexual Activity    Alcohol use: No    Drug use: Yes     Types: Suboxone     Comment: history of\"pain pill addiction\" last used 2020    Sexual activity: Not on file   Other Topics Concern    Not on file   Social History Narrative    Not on file     Social Drivers of Health     Financial Resource Strain: Low Risk  (6/25/2024)    Overall Financial Resource Strain (CARDIA)     Difficulty of Paying Living Expenses: Not hard at

## 2025-04-10 RX ORDER — PRAZOSIN HYDROCHLORIDE 5 MG/1
CAPSULE ORAL
Qty: 30 CAPSULE | Refills: 0 | Status: SHIPPED | OUTPATIENT
Start: 2025-04-10

## 2025-04-10 RX ORDER — PRAZOSIN HYDROCHLORIDE 5 MG/1
5 CAPSULE ORAL NIGHTLY
Qty: 30 CAPSULE | Refills: 3 | OUTPATIENT
Start: 2025-04-10

## 2025-04-29 ENCOUNTER — OFFICE VISIT (OUTPATIENT)
Dept: INTERNAL MEDICINE CLINIC | Age: 50
End: 2025-04-29

## 2025-04-29 VITALS
BODY MASS INDEX: 30.9 KG/M2 | DIASTOLIC BLOOD PRESSURE: 66 MMHG | SYSTOLIC BLOOD PRESSURE: 131 MMHG | RESPIRATION RATE: 18 BRPM | HEART RATE: 92 BPM | WEIGHT: 181 LBS | HEIGHT: 64 IN

## 2025-04-29 DIAGNOSIS — F33.1 MODERATE EPISODE OF RECURRENT MAJOR DEPRESSIVE DISORDER (HCC): ICD-10-CM

## 2025-04-29 DIAGNOSIS — F11.20 SEVERE OPIOID USE DISORDER (HCC): Primary | Chronic | ICD-10-CM

## 2025-04-29 DIAGNOSIS — N95.1 MENOPAUSAL SYNDROME (HOT FLASHES): ICD-10-CM

## 2025-04-29 PROCEDURE — 99214 OFFICE O/P EST MOD 30 MIN: CPT | Performed by: NURSE PRACTITIONER

## 2025-04-29 PROCEDURE — 80305 DRUG TEST PRSMV DIR OPT OBS: CPT | Performed by: NURSE PRACTITIONER

## 2025-04-29 RX ORDER — PRAZOSIN HYDROCHLORIDE 5 MG/1
5 CAPSULE ORAL NIGHTLY
Qty: 30 CAPSULE | Refills: 2 | Status: SHIPPED | OUTPATIENT
Start: 2025-04-29

## 2025-04-29 RX ORDER — BUPRENORPHINE HYDROCHLORIDE AND NALOXONE HYDROCHLORIDE DIHYDRATE 8; 2 MG/1; MG/1
1.5 TABLET SUBLINGUAL DAILY
Qty: 42 TABLET | Refills: 0 | Status: SHIPPED | OUTPATIENT
Start: 2025-04-29 | End: 2025-05-27

## 2025-04-29 RX ORDER — ARIPIPRAZOLE 10 MG/1
10 TABLET ORAL DAILY
Qty: 30 TABLET | Refills: 2 | Status: SHIPPED | OUTPATIENT
Start: 2025-04-29 | End: 2025-05-29

## 2025-04-29 NOTE — PROGRESS NOTES
04/29/25   The patients primary care physician is Luan Larios DO Stormy L Tejas is a 49 y.o.  female who presents in office today for follow up medication assisted treatment, substance use disorder.   Transferred care from [facility] 6/25/24    Pt denies any urges, triggers, or cravings. Reportedly stable on suboxone > 5 years     UDS acceptable    Effexor has been effective with hot flashes and mood swings.     Will order routine labs next visit. Last completed cmp was in July 2024- scanned results    No additional needs or concerns voiced    Pertinent Drug History  Started buying tramadol online at the age of 29 after a tonsillectomy  Taking approx 10 tablets daily  5 years ago her  passed away and she started taking adderral  She then seeked tx and has been stable on suboxone ever since.         Past Medical History:   Diagnosis Date    Anxiety     Depression     Moderate major depression (HCC) 09/03/2020    Opiate addiction (HCC)          Social History     Socioeconomic History    Marital status:      Spouse name: Not on file    Number of children: Not on file    Years of education: Not on file    Highest education level: Not on file   Occupational History    Not on file   Tobacco Use    Smoking status: Every Day     Current packs/day: 0.50     Average packs/day: 0.5 packs/day for 10.0 years (5.0 ttl pk-yrs)     Types: Cigarettes    Smokeless tobacco: Never   Vaping Use    Vaping status: Never Used   Substance and Sexual Activity    Alcohol use: No    Drug use: Yes     Types: Suboxone     Comment: history of\"pain pill addiction\" last used 2020    Sexual activity: Not on file   Other Topics Concern    Not on file   Social History Narrative    Not on file     Social Drivers of Health     Financial Resource Strain: Low Risk  (6/25/2024)    Overall Financial Resource Strain (CARDIA)     Difficulty of Paying Living Expenses: Not hard at all   Food Insecurity: No Food Insecurity (6/25/2024)

## 2025-05-27 ENCOUNTER — OFFICE VISIT (OUTPATIENT)
Dept: INTERNAL MEDICINE CLINIC | Age: 50
End: 2025-05-27

## 2025-05-27 ENCOUNTER — LAB (OUTPATIENT)
Dept: LAB | Age: 50
End: 2025-05-27

## 2025-05-27 VITALS
HEART RATE: 65 BPM | SYSTOLIC BLOOD PRESSURE: 142 MMHG | BODY MASS INDEX: 30.55 KG/M2 | RESPIRATION RATE: 16 BRPM | WEIGHT: 178 LBS | DIASTOLIC BLOOD PRESSURE: 86 MMHG

## 2025-05-27 DIAGNOSIS — F11.20 SEVERE OPIOID USE DISORDER (HCC): Primary | ICD-10-CM

## 2025-05-27 DIAGNOSIS — F33.1 MODERATE EPISODE OF RECURRENT MAJOR DEPRESSIVE DISORDER (HCC): ICD-10-CM

## 2025-05-27 LAB
ALBUMIN SERPL BCG-MCNC: 4.3 G/DL (ref 3.4–4.9)
ALCOHOL URINE: ABNORMAL
ALP SERPL-CCNC: 95 U/L (ref 38–126)
ALT SERPL W/O P-5'-P-CCNC: 35 U/L (ref 10–35)
AMPHETAMINE SCREEN URINE: ABNORMAL
ANION GAP SERPL CALC-SCNC: 12 MEQ/L (ref 8–16)
AST SERPL-CCNC: 28 U/L (ref 10–35)
BARBITURATE SCREEN URINE: ABNORMAL
BASOPHILS ABSOLUTE: 0.1 THOU/MM3 (ref 0–0.1)
BASOPHILS NFR BLD AUTO: 1.1 %
BENZODIAZEPINE SCREEN, URINE: ABNORMAL
BILIRUB SERPL-MCNC: < 0.2 MG/DL (ref 0.3–1.2)
BUN SERPL-MCNC: 12 MG/DL (ref 8–23)
BUPRENORPHINE URINE: ABNORMAL
CALCIUM SERPL-MCNC: 9.6 MG/DL (ref 8.6–10)
CHLORIDE SERPL-SCNC: 107 MEQ/L (ref 98–111)
CO2 SERPL-SCNC: 24 MEQ/L (ref 22–29)
COCAINE METABOLITE SCREEN URINE: ABNORMAL
CREAT SERPL-MCNC: 0.6 MG/DL (ref 0.5–0.9)
DEPRECATED RDW RBC AUTO: 46.9 FL (ref 35–45)
EOSINOPHIL NFR BLD AUTO: 1.7 %
EOSINOPHILS ABSOLUTE: 0.1 THOU/MM3 (ref 0–0.4)
ERYTHROCYTE [DISTWIDTH] IN BLOOD BY AUTOMATED COUNT: 13.4 % (ref 11.5–14.5)
FENTANYL SCREEN, URINE: ABNORMAL
GABAPENTIN SCREEN, URINE: ABNORMAL
GFR SERPL CREATININE-BSD FRML MDRD: > 90 ML/MIN/1.73M2
GLUCOSE SERPL-MCNC: 91 MG/DL (ref 74–109)
HCT VFR BLD AUTO: 38.9 % (ref 37–47)
HGB BLD-MCNC: 12.5 GM/DL (ref 12–16)
IMM GRANULOCYTES # BLD AUTO: 0.01 THOU/MM3 (ref 0–0.07)
IMM GRANULOCYTES NFR BLD AUTO: 0.2 %
LYMPHOCYTES ABSOLUTE: 1.9 THOU/MM3 (ref 1–4.8)
LYMPHOCYTES NFR BLD AUTO: 34.7 %
MCH RBC QN AUTO: 30.5 PG (ref 26–33)
MCHC RBC AUTO-ENTMCNC: 32.1 GM/DL (ref 32.2–35.5)
MCV RBC AUTO: 94.9 FL (ref 81–99)
MDMA, URINE: ABNORMAL
METHADONE SCREEN, URINE: ABNORMAL
METHAMPHETAMINE, URINE: ABNORMAL
MONOCYTES ABSOLUTE: 0.4 THOU/MM3 (ref 0.4–1.3)
MONOCYTES NFR BLD AUTO: 8.3 %
NEUTROPHILS ABSOLUTE: 2.9 THOU/MM3 (ref 1.8–7.7)
NEUTROPHILS NFR BLD AUTO: 54 %
NRBC BLD AUTO-RTO: 0 /100 WBC
OPIATE SCREEN URINE: ABNORMAL
OXYCODONE SCREEN URINE: ABNORMAL
PHENCYCLIDINE SCREEN URINE: ABNORMAL
PLATELET # BLD AUTO: 221 THOU/MM3 (ref 130–400)
PMV BLD AUTO: 11.6 FL (ref 9.4–12.4)
POTASSIUM SERPL-SCNC: 3.9 MEQ/L (ref 3.5–5.2)
PROPOXYPHENE SCREEN, URINE: ABNORMAL
PROT SERPL-MCNC: 6.8 G/DL (ref 6.4–8.3)
RBC # BLD AUTO: 4.1 MILL/MM3 (ref 4.2–5.4)
SODIUM SERPL-SCNC: 143 MEQ/L (ref 135–145)
SYNTHETIC CANNABINOIDS(K2) SCREEN, URINE: ABNORMAL
THC SCREEN, URINE: ABNORMAL
TRAMADOL SCREEN URINE: ABNORMAL
TRICYCLIC ANTIDEPRESSANTS, UR: ABNORMAL
TSH SERPL DL<=0.05 MIU/L-ACNC: 1.39 UIU/ML (ref 0.27–4.2)
WBC # BLD AUTO: 5.4 THOU/MM3 (ref 4.8–10.8)

## 2025-05-27 PROCEDURE — 80305 DRUG TEST PRSMV DIR OPT OBS: CPT | Performed by: NURSE PRACTITIONER

## 2025-05-27 PROCEDURE — 99214 OFFICE O/P EST MOD 30 MIN: CPT | Performed by: NURSE PRACTITIONER

## 2025-05-27 RX ORDER — BUPRENORPHINE HYDROCHLORIDE AND NALOXONE HYDROCHLORIDE DIHYDRATE 8; 2 MG/1; MG/1
1.5 TABLET SUBLINGUAL DAILY
Qty: 41 TABLET | Refills: 0 | Status: SHIPPED | OUTPATIENT
Start: 2025-05-27 | End: 2025-06-23

## 2025-05-27 ASSESSMENT — ENCOUNTER SYMPTOMS
SHORTNESS OF BREATH: 0
NAUSEA: 0
DIARRHEA: 0
CONSTIPATION: 0
CHEST TIGHTNESS: 0
COUGH: 0
ABDOMINAL PAIN: 0
VOMITING: 0
WHEEZING: 0

## 2025-05-27 NOTE — PROGRESS NOTES
05/27/25   The patients primary care physician is Luan Larios DO Stormy L Tejas is a 49 y.o.  female who presents in office today for follow up medication assisted treatment, substance use disorder.   Transferred care from [facility] 6/25/24     Pt denies any urges, triggers, or cravings. Reportedly stable on suboxone > 5 years    States mood stable on current medication regimen     UDS acceptable    Agrees to complete labs today    Pertinent Drug History  Started buying tramadol online at the age of 29 after a tonsillectomy  Taking approx 10 tablets daily  5 years ago her  passed away and she started taking adderral  She then seeked tx and has been stable on suboxone ever since.         Past Medical History:   Diagnosis Date    Anxiety     Depression     Moderate major depression (HCC) 09/03/2020    Opiate addiction (HCC)          Social History     Socioeconomic History    Marital status:      Spouse name: Not on file    Number of children: Not on file    Years of education: Not on file    Highest education level: Not on file   Occupational History    Not on file   Tobacco Use    Smoking status: Every Day     Current packs/day: 0.50     Average packs/day: 0.5 packs/day for 10.0 years (5.0 ttl pk-yrs)     Types: Cigarettes    Smokeless tobacco: Never   Vaping Use    Vaping status: Never Used   Substance and Sexual Activity    Alcohol use: No    Drug use: Yes     Types: Suboxone     Comment: history of\"pain pill addiction\" last used 2020    Sexual activity: Not on file   Other Topics Concern    Not on file   Social History Narrative    Not on file     Social Drivers of Health     Financial Resource Strain: Low Risk  (6/25/2024)    Overall Financial Resource Strain (CARDIA)     Difficulty of Paying Living Expenses: Not hard at all   Food Insecurity: No Food Insecurity (6/25/2024)    Hunger Vital Sign     Worried About Running Out of Food in the Last Year: Never true     Ran Out of Food in the

## 2025-06-24 ENCOUNTER — CLINICAL SUPPORT (OUTPATIENT)
Age: 50
End: 2025-06-24

## 2025-06-24 DIAGNOSIS — F11.20 SEVERE OPIOID USE DISORDER (HCC): Primary | ICD-10-CM

## 2025-06-24 PROCEDURE — 80305 DRUG TEST PRSMV DIR OPT OBS: CPT | Performed by: NURSE PRACTITIONER

## 2025-06-24 RX ORDER — VENLAFAXINE HYDROCHLORIDE 37.5 MG/1
37.5 CAPSULE, EXTENDED RELEASE ORAL DAILY
Qty: 30 CAPSULE | Refills: 3 | Status: SHIPPED | OUTPATIENT
Start: 2025-06-24

## 2025-06-24 RX ORDER — PRAZOSIN HYDROCHLORIDE 5 MG/1
5 CAPSULE ORAL NIGHTLY
Qty: 30 CAPSULE | Refills: 2 | Status: SHIPPED | OUTPATIENT
Start: 2025-06-24

## 2025-06-24 RX ORDER — BUPRENORPHINE HYDROCHLORIDE AND NALOXONE HYDROCHLORIDE DIHYDRATE 8; 2 MG/1; MG/1
1.5 TABLET SUBLINGUAL DAILY
Qty: 42 TABLET | Refills: 0 | Status: SHIPPED | OUTPATIENT
Start: 2025-06-24 | End: 2025-07-22

## 2025-06-24 RX ORDER — ARIPIPRAZOLE 10 MG/1
10 TABLET ORAL DAILY
Qty: 30 TABLET | Refills: 2 | Status: SHIPPED | OUTPATIENT
Start: 2025-06-24 | End: 2025-07-24

## 2025-07-22 ENCOUNTER — OFFICE VISIT (OUTPATIENT)
Age: 50
End: 2025-07-22

## 2025-07-22 VITALS
HEART RATE: 73 BPM | SYSTOLIC BLOOD PRESSURE: 134 MMHG | RESPIRATION RATE: 16 BRPM | BODY MASS INDEX: 29.35 KG/M2 | DIASTOLIC BLOOD PRESSURE: 82 MMHG | WEIGHT: 171 LBS

## 2025-07-22 DIAGNOSIS — F11.20 SEVERE OPIOID USE DISORDER (HCC): Primary | ICD-10-CM

## 2025-07-22 DIAGNOSIS — F33.1 MODERATE EPISODE OF RECURRENT MAJOR DEPRESSIVE DISORDER (HCC): ICD-10-CM

## 2025-07-22 PROCEDURE — 99214 OFFICE O/P EST MOD 30 MIN: CPT | Performed by: NURSE PRACTITIONER

## 2025-07-22 PROCEDURE — 80305 DRUG TEST PRSMV DIR OPT OBS: CPT | Performed by: NURSE PRACTITIONER

## 2025-07-22 RX ORDER — PRAZOSIN HYDROCHLORIDE 5 MG/1
5 CAPSULE ORAL NIGHTLY
Qty: 30 CAPSULE | Refills: 2 | Status: SHIPPED | OUTPATIENT
Start: 2025-07-22

## 2025-07-22 RX ORDER — BUPRENORPHINE HYDROCHLORIDE AND NALOXONE HYDROCHLORIDE DIHYDRATE 8; 2 MG/1; MG/1
1.5 TABLET SUBLINGUAL DAILY
Qty: 42 TABLET | Refills: 0 | Status: SHIPPED | OUTPATIENT
Start: 2025-07-22 | End: 2025-08-19

## 2025-07-22 NOTE — PROGRESS NOTES
07/22/25   The patients primary care physician is Luan Larios DO Stormy L Tejas is a 49 y.o.  female who presents in office today for follow up medication assisted treatment, substance use disorder.   Transferred care from [facility] 6/25/24     Pt denies any urges, triggers, or cravings.     UDS acceptable    Feels stable on current medication regimen. Sleeping well. No acute concerns or additional needs voiced    Labs reviewed    Pertinent Drug History  Started buying tramadol online at the age of 29 after a tonsillectomy  Taking approx 10 tablets daily  5 years ago her  passed away and she started taking adderral  She then seeked tx and has been stable on suboxone ever since.   Past Medical History:   Diagnosis Date    Anxiety     Depression     Moderate major depression (HCC) 09/03/2020    Opiate addiction (HCC)          Social History     Socioeconomic History    Marital status:      Spouse name: Not on file    Number of children: Not on file    Years of education: Not on file    Highest education level: Not on file   Occupational History    Not on file   Tobacco Use    Smoking status: Every Day     Current packs/day: 0.50     Average packs/day: 0.5 packs/day for 10.0 years (5.0 ttl pk-yrs)     Types: Cigarettes    Smokeless tobacco: Never   Vaping Use    Vaping status: Never Used   Substance and Sexual Activity    Alcohol use: No    Drug use: Yes     Types: Suboxone     Comment: history of\"pain pill addiction\" last used 2020    Sexual activity: Not on file   Other Topics Concern    Not on file   Social History Narrative    Not on file     Social Drivers of Health     Financial Resource Strain: Low Risk  (6/25/2024)    Overall Financial Resource Strain (CARDIA)     Difficulty of Paying Living Expenses: Not hard at all   Food Insecurity: No Food Insecurity (6/25/2024)    Hunger Vital Sign     Worried About Running Out of Food in the Last Year: Never true     Ran Out of Food in the Last

## 2025-08-19 ENCOUNTER — OFFICE VISIT (OUTPATIENT)
Age: 50
End: 2025-08-19

## 2025-08-19 VITALS
RESPIRATION RATE: 16 BRPM | BODY MASS INDEX: 29.35 KG/M2 | SYSTOLIC BLOOD PRESSURE: 138 MMHG | DIASTOLIC BLOOD PRESSURE: 78 MMHG | HEART RATE: 87 BPM | WEIGHT: 171 LBS

## 2025-08-19 DIAGNOSIS — F11.20 SEVERE OPIOID USE DISORDER (HCC): Primary | ICD-10-CM

## 2025-08-19 LAB
ALCOHOL URINE: NORMAL
AMPHETAMINE SCREEN URINE: NORMAL
BARBITURATE SCREEN URINE: NORMAL
BENZODIAZEPINE SCREEN, URINE: NORMAL
BUPRENORPHINE URINE: NORMAL
COCAINE METABOLITE SCREEN URINE: NORMAL
FENTANYL SCREEN, URINE: NORMAL
GABAPENTIN SCREEN, URINE: NORMAL
MDMA, URINE: NORMAL
METHADONE SCREEN, URINE: NORMAL
METHAMPHETAMINE, URINE: NORMAL
OPIATE SCREEN URINE: NORMAL
OXYCODONE SCREEN URINE: NORMAL
PHENCYCLIDINE SCREEN URINE: NORMAL
PROPOXYPHENE SCREEN, URINE: NORMAL
SYNTHETIC CANNABINOIDS(K2) SCREEN, URINE: NORMAL
THC SCREEN, URINE: NORMAL
TRAMADOL SCREEN URINE: NORMAL
TRICYCLIC ANTIDEPRESSANTS, UR: NORMAL

## 2025-08-19 PROCEDURE — 99213 OFFICE O/P EST LOW 20 MIN: CPT | Performed by: NURSE PRACTITIONER

## 2025-08-19 PROCEDURE — 80305 DRUG TEST PRSMV DIR OPT OBS: CPT | Performed by: NURSE PRACTITIONER

## 2025-08-19 RX ORDER — BUPRENORPHINE HYDROCHLORIDE AND NALOXONE HYDROCHLORIDE DIHYDRATE 8; 2 MG/1; MG/1
1.5 TABLET SUBLINGUAL DAILY
Qty: 42 TABLET | Refills: 0 | Status: SHIPPED | OUTPATIENT
Start: 2025-08-19 | End: 2025-09-16

## 2025-08-19 RX ORDER — BUPRENORPHINE HYDROCHLORIDE AND NALOXONE HYDROCHLORIDE DIHYDRATE 8; 2 MG/1; MG/1
1.5 TABLET SUBLINGUAL DAILY
Qty: 42 TABLET | Refills: 0 | Status: SHIPPED | OUTPATIENT
Start: 2025-08-19 | End: 2025-08-19

## 2025-08-19 RX ORDER — FLUOXETINE 10 MG/1
20 CAPSULE ORAL DAILY
Qty: 180 CAPSULE | Refills: 1 | Status: SHIPPED | OUTPATIENT
Start: 2025-08-19

## 2025-08-19 RX ORDER — ARIPIPRAZOLE 10 MG/1
10 TABLET ORAL DAILY
Qty: 30 TABLET | Refills: 2 | Status: SHIPPED | OUTPATIENT
Start: 2025-08-19 | End: 2025-09-18

## 2025-08-25 ENCOUNTER — TELEPHONE (OUTPATIENT)
Age: 50
End: 2025-08-25

## 2025-08-25 RX ORDER — PRAZOSIN HYDROCHLORIDE 5 MG/1
5 CAPSULE ORAL NIGHTLY
Qty: 30 CAPSULE | Refills: 2 | Status: SHIPPED | OUTPATIENT
Start: 2025-08-25